# Patient Record
Sex: FEMALE | Race: WHITE | NOT HISPANIC OR LATINO | Employment: OTHER | URBAN - METROPOLITAN AREA
[De-identification: names, ages, dates, MRNs, and addresses within clinical notes are randomized per-mention and may not be internally consistent; named-entity substitution may affect disease eponyms.]

---

## 2017-06-03 ENCOUNTER — HOSPITAL ENCOUNTER (INPATIENT)
Facility: HOSPITAL | Age: 57
LOS: 6 days | Discharge: HOME | DRG: 871 | End: 2017-06-09
Attending: HOSPITALIST | Admitting: HOSPITALIST
Payer: MEDICAID

## 2017-06-03 ENCOUNTER — HOSPITAL ENCOUNTER (EMERGENCY)
Facility: HOSPITAL | Age: 57
Discharge: ANOTHER HOSPITAL | End: 2017-06-03
Attending: SURGERY
Payer: MEDICAID

## 2017-06-03 VITALS
SYSTOLIC BLOOD PRESSURE: 150 MMHG | BODY MASS INDEX: 38.09 KG/M2 | HEART RATE: 96 BPM | HEIGHT: 60 IN | DIASTOLIC BLOOD PRESSURE: 90 MMHG | WEIGHT: 194 LBS | RESPIRATION RATE: 14 BRPM | OXYGEN SATURATION: 98 %

## 2017-06-03 DIAGNOSIS — R41.82 ALTERED MENTAL STATUS, UNSPECIFIED ALTERED MENTAL STATUS TYPE: Primary | ICD-10-CM

## 2017-06-03 DIAGNOSIS — N19 RENAL FAILURE: ICD-10-CM

## 2017-06-03 DIAGNOSIS — E87.5 HYPERKALEMIA: ICD-10-CM

## 2017-06-03 DIAGNOSIS — A41.9 SEPSIS, DUE TO UNSPECIFIED ORGANISM: ICD-10-CM

## 2017-06-03 DIAGNOSIS — A41.9 SEPSIS: ICD-10-CM

## 2017-06-03 DIAGNOSIS — R41.0 CONFUSION: ICD-10-CM

## 2017-06-03 DIAGNOSIS — G93.40 ACUTE ENCEPHALOPATHY: Primary | ICD-10-CM

## 2017-06-03 DIAGNOSIS — G93.40 ENCEPHALOPATHY: ICD-10-CM

## 2017-06-03 PROBLEM — E87.20 METABOLIC ACIDOSIS, NORMAL ANION GAP (NAG): Status: ACTIVE | Noted: 2017-06-03

## 2017-06-03 PROBLEM — N17.9 AKI (ACUTE KIDNEY INJURY): Status: ACTIVE | Noted: 2017-06-03

## 2017-06-03 PROBLEM — M62.82 RHABDOMYOLYSIS: Status: ACTIVE | Noted: 2017-06-03

## 2017-06-03 PROBLEM — I10 HTN (HYPERTENSION): Status: ACTIVE | Noted: 2017-06-03

## 2017-06-03 LAB
ALBUMIN SERPL BCP-MCNC: 3.2 G/DL
ALBUMIN SERPL BCP-MCNC: 3.7 G/DL
ALP SERPL-CCNC: 103 U/L
ALP SERPL-CCNC: 91 U/L
ALT SERPL W/O P-5'-P-CCNC: 41 U/L
ALT SERPL W/O P-5'-P-CCNC: 41 U/L
AMMONIA PLAS-SCNC: 26 UMOL/L
AMPHET+METHAMPHET UR QL: NEGATIVE
ANION GAP SERPL CALC-SCNC: 12 MMOL/L
ANION GAP SERPL CALC-SCNC: 15 MMOL/L
ANION GAP SERPL CALC-SCNC: 8 MMOL/L
APAP SERPL-MCNC: <3 UG/ML
APTT BLDCRRT: 22.1 SEC
AST SERPL-CCNC: 42 U/L
AST SERPL-CCNC: 57 U/L
BACTERIA #/AREA URNS HPF: ABNORMAL /HPF
BARBITURATES UR QL SCN>200 NG/ML: NEGATIVE
BASOPHILS # BLD AUTO: 0.01 K/UL
BASOPHILS # BLD AUTO: 0.01 K/UL
BASOPHILS NFR BLD: 0 %
BASOPHILS NFR BLD: 0 %
BENZODIAZ UR QL SCN>200 NG/ML: NEGATIVE
BILIRUB SERPL-MCNC: 0.5 MG/DL
BILIRUB SERPL-MCNC: 0.6 MG/DL
BILIRUB UR QL STRIP: ABNORMAL
BNP SERPL-MCNC: 126 PG/ML
BUN SERPL-MCNC: 39 MG/DL
BUN SERPL-MCNC: 63 MG/DL
BUN SERPL-MCNC: 72 MG/DL
BZE UR QL SCN: NEGATIVE
CALCIUM SERPL-MCNC: 8.2 MG/DL
CALCIUM SERPL-MCNC: 8.4 MG/DL
CALCIUM SERPL-MCNC: 9.4 MG/DL
CANNABINOIDS UR QL SCN: NEGATIVE
CHLORIDE SERPL-SCNC: 108 MMOL/L
CHLORIDE SERPL-SCNC: 111 MMOL/L
CHLORIDE SERPL-SCNC: 112 MMOL/L
CK MB SERPL-MCNC: 32.5 NG/ML
CK MB SERPL-RTO: 2.6 %
CK SERPL-CCNC: 1246 U/L
CK SERPL-CCNC: 1246 U/L
CK SERPL-CCNC: 1720 U/L
CLARITY UR: CLEAR
CO2 SERPL-SCNC: 18 MMOL/L
CO2 SERPL-SCNC: 18 MMOL/L
CO2 SERPL-SCNC: 20 MMOL/L
COLOR UR: ABNORMAL
CREAT SERPL-MCNC: 1.3 MG/DL
CREAT SERPL-MCNC: 2.5 MG/DL
CREAT SERPL-MCNC: 3.5 MG/DL
CREAT UR-MCNC: 193.8 MG/DL
DIFFERENTIAL METHOD: ABNORMAL
DIFFERENTIAL METHOD: ABNORMAL
EOSINOPHIL # BLD AUTO: 0 K/UL
EOSINOPHIL # BLD AUTO: 0 K/UL
EOSINOPHIL NFR BLD: 0 %
EOSINOPHIL NFR BLD: 0 %
ERYTHROCYTE [DISTWIDTH] IN BLOOD BY AUTOMATED COUNT: 13 %
ERYTHROCYTE [DISTWIDTH] IN BLOOD BY AUTOMATED COUNT: 13.1 %
EST. GFR  (AFRICAN AMERICAN): 16 ML/MIN/1.73 M^2
EST. GFR  (AFRICAN AMERICAN): 24 ML/MIN/1.73 M^2
EST. GFR  (AFRICAN AMERICAN): 53 ML/MIN/1.73 M^2
EST. GFR  (NON AFRICAN AMERICAN): 14 ML/MIN/1.73 M^2
EST. GFR  (NON AFRICAN AMERICAN): 21 ML/MIN/1.73 M^2
EST. GFR  (NON AFRICAN AMERICAN): 46 ML/MIN/1.73 M^2
GLUCOSE SERPL-MCNC: 116 MG/DL
GLUCOSE SERPL-MCNC: 158 MG/DL
GLUCOSE SERPL-MCNC: 167 MG/DL
GLUCOSE UR QL STRIP: NEGATIVE
HCT VFR BLD AUTO: 38.3 %
HCT VFR BLD AUTO: 40.7 %
HGB BLD-MCNC: 12.5 G/DL
HGB BLD-MCNC: 13.1 G/DL
HGB UR QL STRIP: NEGATIVE
INR PPP: 0.9
KETONES UR QL STRIP: ABNORMAL
LACTATE SERPL-SCNC: 1.8 MMOL/L
LACTATE SERPL-SCNC: 2 MMOL/L
LEUKOCYTE ESTERASE UR QL STRIP: ABNORMAL
LYMPHOCYTES # BLD AUTO: 0.7 K/UL
LYMPHOCYTES # BLD AUTO: 1.7 K/UL
LYMPHOCYTES NFR BLD: 3.2 %
LYMPHOCYTES NFR BLD: 7.6 %
MAGNESIUM SERPL-MCNC: 2.1 MG/DL
MAGNESIUM SERPL-MCNC: 3.3 MG/DL
MCH RBC QN AUTO: 31.7 PG
MCH RBC QN AUTO: 32 PG
MCHC RBC AUTO-ENTMCNC: 32.2 %
MCHC RBC AUTO-ENTMCNC: 32.6 %
MCV RBC AUTO: 98 FL
MCV RBC AUTO: 99 FL
METHADONE UR QL SCN>300 NG/ML: NEGATIVE
MICROSCOPIC COMMENT: ABNORMAL
MONOCYTES # BLD AUTO: 1.4 K/UL
MONOCYTES # BLD AUTO: 2.9 K/UL
MONOCYTES NFR BLD: 12.8 %
MONOCYTES NFR BLD: 6.4 %
NEUTROPHILS # BLD AUTO: 18 K/UL
NEUTROPHILS # BLD AUTO: 19.5 K/UL
NEUTROPHILS NFR BLD: 79.6 %
NEUTROPHILS NFR BLD: 90.4 %
NITRITE UR QL STRIP: NEGATIVE
OPIATES UR QL SCN: NORMAL
PCP UR QL SCN>25 NG/ML: NEGATIVE
PH UR STRIP: 5 [PH] (ref 5–8)
PHOSPHATE SERPL-MCNC: 2.5 MG/DL
PHOSPHATE SERPL-MCNC: 7.5 MG/DL
PLATELET # BLD AUTO: 150 K/UL
PLATELET # BLD AUTO: 197 K/UL
PMV BLD AUTO: 9.3 FL
PMV BLD AUTO: 9.5 FL
POTASSIUM SERPL-SCNC: 5.1 MMOL/L
POTASSIUM SERPL-SCNC: 6.1 MMOL/L
POTASSIUM SERPL-SCNC: 7.1 MMOL/L
PROT SERPL-MCNC: 6.6 G/DL
PROT SERPL-MCNC: 7.4 G/DL
PROT UR QL STRIP: ABNORMAL
PROTHROMBIN TIME: 9.3 SEC
RBC # BLD AUTO: 3.91 M/UL
RBC # BLD AUTO: 4.13 M/UL
RBC #/AREA URNS HPF: 5 /HPF (ref 0–4)
SALICYLATES SERPL-MCNC: <5 MG/DL
SODIUM SERPL-SCNC: 139 MMOL/L
SODIUM SERPL-SCNC: 141 MMOL/L
SODIUM SERPL-SCNC: 142 MMOL/L
SP GR UR STRIP: 1.02 (ref 1–1.03)
TOXICOLOGY INFORMATION: NORMAL
TROPONIN I SERPL DL<=0.01 NG/ML-MCNC: <0.006 NG/ML
TSH SERPL DL<=0.005 MIU/L-ACNC: 0.66 UIU/ML
URN SPEC COLLECT METH UR: ABNORMAL
UROBILINOGEN UR STRIP-ACNC: NEGATIVE EU/DL
WBC # BLD AUTO: 21.55 K/UL
WBC # BLD AUTO: 22.76 K/UL
WBC #/AREA URNS HPF: 40 /HPF (ref 0–5)
YEAST URNS QL MICRO: ABNORMAL

## 2017-06-03 PROCEDURE — 93005 ELECTROCARDIOGRAM TRACING: CPT

## 2017-06-03 PROCEDURE — 96365 THER/PROPH/DIAG IV INF INIT: CPT

## 2017-06-03 PROCEDURE — 36415 COLL VENOUS BLD VENIPUNCTURE: CPT

## 2017-06-03 PROCEDURE — 99285 EMERGENCY DEPT VISIT HI MDM: CPT | Mod: 25

## 2017-06-03 PROCEDURE — 93010 ELECTROCARDIOGRAM REPORT: CPT | Mod: 77,,, | Performed by: INTERNAL MEDICINE

## 2017-06-03 PROCEDURE — 84484 ASSAY OF TROPONIN QUANT: CPT

## 2017-06-03 PROCEDURE — 96361 HYDRATE IV INFUSION ADD-ON: CPT

## 2017-06-03 PROCEDURE — 82140 ASSAY OF AMMONIA: CPT

## 2017-06-03 PROCEDURE — 27000221 HC OXYGEN, UP TO 24 HOURS

## 2017-06-03 PROCEDURE — 84443 ASSAY THYROID STIM HORMONE: CPT

## 2017-06-03 PROCEDURE — 94640 AIRWAY INHALATION TREATMENT: CPT

## 2017-06-03 PROCEDURE — 85025 COMPLETE CBC W/AUTO DIFF WBC: CPT | Mod: 91

## 2017-06-03 PROCEDURE — 84100 ASSAY OF PHOSPHORUS: CPT | Mod: 91

## 2017-06-03 PROCEDURE — 82553 CREATINE MB FRACTION: CPT

## 2017-06-03 PROCEDURE — 85730 THROMBOPLASTIN TIME PARTIAL: CPT

## 2017-06-03 PROCEDURE — 96375 TX/PRO/DX INJ NEW DRUG ADDON: CPT

## 2017-06-03 PROCEDURE — 81000 URINALYSIS NONAUTO W/SCOPE: CPT

## 2017-06-03 PROCEDURE — 63600175 PHARM REV CODE 636 W HCPCS: Performed by: SURGERY

## 2017-06-03 PROCEDURE — 63600175 PHARM REV CODE 636 W HCPCS: Performed by: HOSPITALIST

## 2017-06-03 PROCEDURE — 27000494 *HC OXYGEN SET UP (STAH ONLY)

## 2017-06-03 PROCEDURE — 83605 ASSAY OF LACTIC ACID: CPT

## 2017-06-03 PROCEDURE — 80053 COMPREHEN METABOLIC PANEL: CPT

## 2017-06-03 PROCEDURE — 11000001 HC ACUTE MED/SURG PRIVATE ROOM

## 2017-06-03 PROCEDURE — 25000242 PHARM REV CODE 250 ALT 637 W/ HCPCS: Performed by: SURGERY

## 2017-06-03 PROCEDURE — 87076 CULTURE ANAEROBE IDENT EACH: CPT

## 2017-06-03 PROCEDURE — 80048 BASIC METABOLIC PNL TOTAL CA: CPT

## 2017-06-03 PROCEDURE — 85025 COMPLETE CBC W/AUTO DIFF WBC: CPT

## 2017-06-03 PROCEDURE — 80053 COMPREHEN METABOLIC PANEL: CPT | Mod: 91

## 2017-06-03 PROCEDURE — 93010 ELECTROCARDIOGRAM REPORT: CPT | Mod: ,,, | Performed by: INTERNAL MEDICINE

## 2017-06-03 PROCEDURE — 87040 BLOOD CULTURE FOR BACTERIA: CPT | Mod: 59

## 2017-06-03 PROCEDURE — 63600175 PHARM REV CODE 636 W HCPCS

## 2017-06-03 PROCEDURE — 80329 ANALGESICS NON-OPIOID 1 OR 2: CPT

## 2017-06-03 PROCEDURE — 84100 ASSAY OF PHOSPHORUS: CPT

## 2017-06-03 PROCEDURE — 25000003 PHARM REV CODE 250: Performed by: SURGERY

## 2017-06-03 PROCEDURE — 85610 PROTHROMBIN TIME: CPT

## 2017-06-03 PROCEDURE — 80307 DRUG TEST PRSMV CHEM ANLYZR: CPT | Mod: 59

## 2017-06-03 PROCEDURE — 83880 ASSAY OF NATRIURETIC PEPTIDE: CPT

## 2017-06-03 PROCEDURE — 83735 ASSAY OF MAGNESIUM: CPT | Mod: 91

## 2017-06-03 PROCEDURE — 25000003 PHARM REV CODE 250: Performed by: HOSPITALIST

## 2017-06-03 PROCEDURE — 83605 ASSAY OF LACTIC ACID: CPT | Mod: 91

## 2017-06-03 PROCEDURE — 82550 ASSAY OF CK (CPK): CPT | Mod: 91

## 2017-06-03 PROCEDURE — 83735 ASSAY OF MAGNESIUM: CPT

## 2017-06-03 PROCEDURE — 80307 DRUG TEST PRSMV CHEM ANLYZR: CPT

## 2017-06-03 RX ORDER — HYDROCODONE BITARTRATE AND ACETAMINOPHEN 10; 325 MG/1; MG/1
1 TABLET ORAL EVERY 8 HOURS PRN
COMMUNITY

## 2017-06-03 RX ORDER — SODIUM CHLORIDE 9 MG/ML
INJECTION, SOLUTION INTRAVENOUS CONTINUOUS
Status: DISCONTINUED | OUTPATIENT
Start: 2017-06-03 | End: 2017-06-04

## 2017-06-03 RX ORDER — ONDANSETRON 4 MG/1
8 TABLET, ORALLY DISINTEGRATING ORAL EVERY 8 HOURS PRN
Status: DISCONTINUED | OUTPATIENT
Start: 2017-06-03 | End: 2017-06-09 | Stop reason: HOSPADM

## 2017-06-03 RX ORDER — ALBUTEROL SULFATE 2.5 MG/.5ML
10 SOLUTION RESPIRATORY (INHALATION)
Status: COMPLETED | OUTPATIENT
Start: 2017-06-03 | End: 2017-06-03

## 2017-06-03 RX ORDER — NALOXONE HCL 0.4 MG/ML
VIAL (ML) INJECTION
Status: COMPLETED
Start: 2017-06-03 | End: 2017-06-03

## 2017-06-03 RX ORDER — HYDRALAZINE HYDROCHLORIDE 25 MG/1
25 TABLET, FILM COATED ORAL EVERY 8 HOURS PRN
Status: DISCONTINUED | OUTPATIENT
Start: 2017-06-04 | End: 2017-06-09 | Stop reason: HOSPADM

## 2017-06-03 RX ORDER — ACETAMINOPHEN 325 MG/1
650 TABLET ORAL EVERY 4 HOURS PRN
Status: DISCONTINUED | OUTPATIENT
Start: 2017-06-03 | End: 2017-06-09 | Stop reason: HOSPADM

## 2017-06-03 RX ORDER — AMOXICILLIN 250 MG
1 CAPSULE ORAL DAILY PRN
Status: DISCONTINUED | OUTPATIENT
Start: 2017-06-03 | End: 2017-06-09 | Stop reason: HOSPADM

## 2017-06-03 RX ORDER — IBUPROFEN 200 MG
16 TABLET ORAL
Status: DISCONTINUED | OUTPATIENT
Start: 2017-06-03 | End: 2017-06-09 | Stop reason: HOSPADM

## 2017-06-03 RX ORDER — SODIUM CHLORIDE 9 MG/ML
1000 INJECTION, SOLUTION INTRAVENOUS
Status: COMPLETED | OUTPATIENT
Start: 2017-06-03 | End: 2017-06-03

## 2017-06-03 RX ORDER — IBUPROFEN 200 MG
24 TABLET ORAL
Status: DISCONTINUED | OUTPATIENT
Start: 2017-06-03 | End: 2017-06-09 | Stop reason: HOSPADM

## 2017-06-03 RX ORDER — NALOXONE HCL 0.4 MG/ML
0.4 VIAL (ML) INJECTION
Status: COMPLETED | OUTPATIENT
Start: 2017-06-03 | End: 2017-06-03

## 2017-06-03 RX ORDER — ALBUTEROL SULFATE 2.5 MG/.5ML
SOLUTION RESPIRATORY (INHALATION)
Status: DISCONTINUED
Start: 2017-06-03 | End: 2017-06-03 | Stop reason: HOSPADM

## 2017-06-03 RX ORDER — ATENOLOL 25 MG/1
25 TABLET ORAL DAILY
COMMUNITY

## 2017-06-03 RX ORDER — LISINOPRIL 10 MG/1
10 TABLET ORAL DAILY
COMMUNITY
End: 2017-06-28

## 2017-06-03 RX ORDER — METRONIDAZOLE 500 MG/100ML
500 INJECTION, SOLUTION INTRAVENOUS
Status: DISCONTINUED | OUTPATIENT
Start: 2017-06-03 | End: 2017-06-05

## 2017-06-03 RX ADMIN — CEFTRIAXONE 1 G: 1 INJECTION, SOLUTION INTRAVENOUS at 05:06

## 2017-06-03 RX ADMIN — SODIUM CHLORIDE 1000 ML: 0.9 INJECTION, SOLUTION INTRAVENOUS at 01:06

## 2017-06-03 RX ADMIN — CEFTRIAXONE 2 G: 2 INJECTION, SOLUTION INTRAVENOUS at 11:06

## 2017-06-03 RX ADMIN — DEXTROSE MONOHYDRATE 25 G: 25 INJECTION, SOLUTION INTRAVENOUS at 12:06

## 2017-06-03 RX ADMIN — INSULIN HUMAN 5 UNITS: 100 INJECTION, SOLUTION PARENTERAL at 12:06

## 2017-06-03 RX ADMIN — SODIUM POLYSTYRENE SULFONATE 15 G: 15 SUSPENSION ORAL; RECTAL at 12:06

## 2017-06-03 RX ADMIN — Medication 0.4 MG: at 11:06

## 2017-06-03 RX ADMIN — SODIUM CHLORIDE 1000 ML: 0.9 INJECTION, SOLUTION INTRAVENOUS at 12:06

## 2017-06-03 RX ADMIN — SODIUM CHLORIDE 1000 ML: 0.9 INJECTION, SOLUTION INTRAVENOUS at 03:06

## 2017-06-03 RX ADMIN — METRONIDAZOLE 500 MG: 500 INJECTION, SOLUTION INTRAVENOUS at 11:06

## 2017-06-03 RX ADMIN — HYDRALAZINE HYDROCHLORIDE 25 MG: 25 TABLET, FILM COATED ORAL at 11:06

## 2017-06-03 RX ADMIN — ALBUTEROL SULFATE 10 MG: 2.5 SOLUTION RESPIRATORY (INHALATION) at 12:06

## 2017-06-03 RX ADMIN — NALOXONE HYDROCHLORIDE 0.4 MG: 0.4 INJECTION, SOLUTION INTRAMUSCULAR; INTRAVENOUS; SUBCUTANEOUS at 11:06

## 2017-06-03 NOTE — ED NOTES
Urine collected from hat placed in bedside commode.  Patient had moderate amount of diarrhea in commode.  Assisted patient to clean herself and then back to bed.

## 2017-06-03 NOTE — ED PROVIDER NOTES
[facility]                                        Alix 3, 2017                   Chief Complaint  56 y.o. female with Altered Mental Status     History of Present Illness  Lien Sotelo presents to the emergency room with altered mental status today  Daughter noticed the patient was normal before she went to bed last night per history  Went to see the patient this morning, found her obtunded on the couch with emesis  Patient was hard to arouse and the daughter called 911, patient confused and agitated  Patient has no history of seizures, has bite marks on the tongue on ER evaluation now  Patient states that she does not remember any of the events preceding the ER arrival  Patient is on pain medication on a daily basis, several more missing per daughter here  Patient cannot get the year right, does not remember the president, appears agitated  Patient has no gross motor deficits or facial droop, hypotensive on arrival as morning    The history is provided by the patient  No past medical history on file.  No past surgical history on file.   No Known Allergies   History reviewed. No pertinent family history.    Review of Systems and Physical Exam     Review of Systems  -- Constitution - no fever, denies fatigue, no weakness, no chills  -- Eyes - no tearing or redness, no visual disturbance  -- Ear, Nose - no tinnitus or earache, no nasal congestion or discharge  -- Mouth,Throat - no sore throat, no toothache, normal voice, normal swallowing  -- Respiratory - denies cough and congestion, no shortness of breath, no BORJA  -- Cardiovascular - denies chest pain, no palpitations, denies claudication  -- Gastrointestinal - denies abdominal pain, nausea, vomiting, or diarrhea  -- Genitourinary - no dysuria, no denies flank pain, no hematuria or frequency   -- Musculoskeletal - denies back pain, negative for myalgias and arthralgias   -- Neurological - found obtunded, confusion, agitation, no gross motor  deficits  -- Skin - denies pallor, rash, or changes in skin. no hives or welts noted    Vital Signs  -- Her blood pressure is 97/81 and her pulse is 95.   -- Her respiration is 14 and oxygen saturation is 92% (abnormal).      Physical Exam  -- Nursing note and vitals reviewed  -- Constitutional: Appears well-developed and well-nourished  -- Head: Atraumatic. Normocephalic. No obvious abnormality  -- Eyes: Pupils are equal and reactive to light. Normal conjunctiva and lids  -- Cardiac: Normal rate, regular rhythm and normal heart sounds  -- Pulmonary: Normal respiratory effort, breath sounds clear to auscultation  -- Abdominal: Soft, no tenderness. Normal bowel sounds. Normal liver edge  -- Genitourinary: no flank pain on exam, no suprapubic pain by palpation   -- Musculoskeletal: Normal range of motion, no effusions. Joints stable   -- Neurological: No focal deficits. Showed good interaction with staff  -- Vascular: Posterior tibial, dorsalis pedis and radial pulses 2+ bilaterally      Emergency Room Course     Abnormal lab values  -- Color, UA Orange (*)   -- Protein, UA Trace (*)   -- Ketones, UA Trace (*)   -- Bilirubin (UA) 1+ (*)   -- Leukocytes, UA Trace (*)   -- Magnesium 3.3 (*)   -- Phosphorus 7.5 (*)   -- Potassium 7.1 (*)   -- CO2 18 (*)   -- Glucose 167 (*)   -- BUN, Bld 72 (*)   -- Creatinine 3.5 (*)   -- AST 42 (*)   -- eGFR  14 (*)   -- WBC 21.55 (*)   -- CPK 1246 (*)   --  (*)   -- RBC, UA 5 (*)   -- WBC, UA 40 (*)   -- Bacteria, UA Moderate (*)   -- Yeast, UA Occasional (*)   -- Potassium 6.1 (*)   -- Chloride 112 (*)   -- CO2 18 (*)   -- Glucose 116 (*)   -- BUN, Bld 63 (*)   -- Creatinine 2.5 (*)   -- Calcium 8.4 (*)     EKG  -- Normal sinus rhythm  -- Nonspecific ST abnormality  -- Abnormal ECG  -- No previous ECGs available    Radiology  -- The CT of the head performed in the ER today was negative for acute pathology  -- Chest x-ray showed no infiltrate and showed no acute pathology      Medications Given  -- dextrose 50% injection 25 g (25 g Intravenous Given 6/3/17 1230)   -- albuterol sulfate 2.5 mg/0.5 mL nebulizer solution (not administered)   -- naloxone 0.4 mg/mL injection 0.4 mg (0.4 mg Intravenous Given 6/3/17 1121)   -- 0.9%  NaCl infusion (0 mLs Intravenous Stopped 6/3/17 1330)   -- sodium polystyrene 15 gram/60 mL suspension 15 g (15 g Oral Given 6/3/17 1233)   -- insulin regular injection 5 Units (5 Units Intravenous Given 6/3/17 1228)   -- albuterol sulfate nebulizer solution 10 mg (10 mg Nebulization Given 6/3/17 1255)   -- 0.9%  NaCl infusion (0 mLs Intravenous Stopped 6/3/17 1533)   -- 0.9%  NaCl infusion (1,000 mLs Intravenous New Bag 6/3/17 1530)   -- 0.9%  NaCl infusion (0 mLs Intravenous Stopped 6/3/17 1506)     Diagnosis  -- Altered mental status  -- Confusion  -- Hyperkalemia  -- Renal failure     Disposition and Plan  -- Disposition: transfer  -- Condition: stable  -- The patient needs a higher level of care  -- The patient is appropriate for transfer  -- The patient was accepted for transfer at [facility]    This note is dictated on Dragon Natural Speaking word recognition program.  There are word recognition mistakes that are occasionally missed on review.           Alan Beckford MD  06/03/17 0384

## 2017-06-03 NOTE — PLAN OF CARE
Outside Transfer Acceptance Note    Transferring Physician or Mid Level Provider/Speciality: Dr Alan Beckford    Accepting Physician: Eugenia Temple     Date of Acceptance: 06/03/2017     Code Status: Full    Transferring Facility/Hospital: [facility]    [place] [facility]    Report from Transferring Physician or Mid-Level provider/Hospital course: 56 F with a PMH of chronic back pain on opioids.  Pt was seen normal last night before bed, but when daughter came over this morning, she found her on the cough with evidence of vomit.  Suspected that she may have taken opioids, but unsure of amount. Pt was unresponsive at that time. EMS brought her to [facility] where she had a normal head CT scan.  Workup revealed hypovolemia, acute renal failure, hyperkalemia, leukocytosis, and UTI. Hyperkalemia and JACEY improving with IV fluids.  Pt now much more responsive, but not appropriate. Concern for encephalopathy due to medication / infection / renal failure.     To do list upon patient arrival: continue IV fluids.  Trend potassium / renal function. Treat UTI. May need neuro consult for help with encephalopathy workup if does not improve with conservative treatment.     Please call extension [phone] upon patient arrival to floor for Hospital Medicine admit team assignment and for additional admit orders. If patient is coming from another [facility] facility please also call [phone] to inform the admit team/office that patient has arrived from the [facility] facility to the floor so patient can be evaluated.

## 2017-06-03 NOTE — ED TRIAGE NOTES
Patient is brought in today with altered mental status.  Daughter found her unresponsive on the cough and called dalila..  There was a bottle of norco that was 2 days old but had almost 40 pill missing.  Patient admits she sold some and took some.  Admits she is addicted to them.

## 2017-06-03 NOTE — ED NOTES
Patient to the bathroom via wheelchair.  Diarrhea noted to toilet.  Patient able to transfer self from wheelchair to bed much better that previously.  Seems less confused but still has trouble finding words.

## 2017-06-04 PROBLEM — R19.7 WATERY DIARRHEA: Status: ACTIVE | Noted: 2017-06-04

## 2017-06-04 LAB
ALBUMIN SERPL BCP-MCNC: 3 G/DL
ALP SERPL-CCNC: 98 U/L
ALT SERPL W/O P-5'-P-CCNC: 40 U/L
ANION GAP SERPL CALC-SCNC: 8 MMOL/L
AST SERPL-CCNC: 51 U/L
BASOPHILS # BLD AUTO: 0.01 K/UL
BASOPHILS # BLD AUTO: 0.02 K/UL
BASOPHILS NFR BLD: 0 %
BASOPHILS NFR BLD: 0.1 %
BILIRUB SERPL-MCNC: 0.5 MG/DL
BUN SERPL-MCNC: 28 MG/DL
CALCIUM SERPL-MCNC: 8.1 MG/DL
CHLORIDE SERPL-SCNC: 111 MMOL/L
CK SERPL-CCNC: 1406 U/L
CO2 SERPL-SCNC: 19 MMOL/L
CREAT SERPL-MCNC: 0.8 MG/DL
DIFFERENTIAL METHOD: ABNORMAL
DIFFERENTIAL METHOD: ABNORMAL
EOSINOPHIL # BLD AUTO: 0 K/UL
EOSINOPHIL # BLD AUTO: 0 K/UL
EOSINOPHIL NFR BLD: 0 %
EOSINOPHIL NFR BLD: 0 %
ERYTHROCYTE [DISTWIDTH] IN BLOOD BY AUTOMATED COUNT: 12.7 %
ERYTHROCYTE [DISTWIDTH] IN BLOOD BY AUTOMATED COUNT: 12.8 %
EST. GFR  (AFRICAN AMERICAN): >60 ML/MIN/1.73 M^2
EST. GFR  (NON AFRICAN AMERICAN): >60 ML/MIN/1.73 M^2
GLUCOSE SERPL-MCNC: 165 MG/DL
HCT VFR BLD AUTO: 38.7 %
HCT VFR BLD AUTO: 39.1 %
HGB BLD-MCNC: 12.8 G/DL
HGB BLD-MCNC: 13.1 G/DL
LYMPHOCYTES # BLD AUTO: 1.4 K/UL
LYMPHOCYTES # BLD AUTO: 1.8 K/UL
LYMPHOCYTES NFR BLD: 6.3 %
LYMPHOCYTES NFR BLD: 8.5 %
MCH RBC QN AUTO: 31.6 PG
MCH RBC QN AUTO: 31.7 PG
MCHC RBC AUTO-ENTMCNC: 33.1 %
MCHC RBC AUTO-ENTMCNC: 33.5 %
MCV RBC AUTO: 94 FL
MCV RBC AUTO: 96 FL
MONOCYTES # BLD AUTO: 1 K/UL
MONOCYTES # BLD AUTO: 1.3 K/UL
MONOCYTES NFR BLD: 4.6 %
MONOCYTES NFR BLD: 5.7 %
NEUTROPHILS # BLD AUTO: 18.2 K/UL
NEUTROPHILS # BLD AUTO: 19.4 K/UL
NEUTROPHILS NFR BLD: 86.4 %
NEUTROPHILS NFR BLD: 87.5 %
PLATELET # BLD AUTO: 137 K/UL
PLATELET # BLD AUTO: 138 K/UL
PMV BLD AUTO: 9.5 FL
PMV BLD AUTO: 9.8 FL
POTASSIUM SERPL-SCNC: 4.1 MMOL/L
PROCALCITONIN SERPL IA-MCNC: 0.12 NG/ML
PROT SERPL-MCNC: 6.3 G/DL
RBC # BLD AUTO: 4.04 M/UL
RBC # BLD AUTO: 4.14 M/UL
SODIUM SERPL-SCNC: 138 MMOL/L
WBC # BLD AUTO: 21.05 K/UL
WBC # BLD AUTO: 22.18 K/UL

## 2017-06-04 PROCEDURE — 85025 COMPLETE CBC W/AUTO DIFF WBC: CPT

## 2017-06-04 PROCEDURE — 36600 WITHDRAWAL OF ARTERIAL BLOOD: CPT

## 2017-06-04 PROCEDURE — 25000003 PHARM REV CODE 250: Performed by: INTERNAL MEDICINE

## 2017-06-04 PROCEDURE — 63600175 PHARM REV CODE 636 W HCPCS: Performed by: HOSPITALIST

## 2017-06-04 PROCEDURE — 99223 1ST HOSP IP/OBS HIGH 75: CPT | Mod: AI,,, | Performed by: INTERNAL MEDICINE

## 2017-06-04 PROCEDURE — 80053 COMPREHEN METABOLIC PANEL: CPT

## 2017-06-04 PROCEDURE — 82803 BLOOD GASES ANY COMBINATION: CPT

## 2017-06-04 PROCEDURE — 36415 COLL VENOUS BLD VENIPUNCTURE: CPT

## 2017-06-04 PROCEDURE — 84145 PROCALCITONIN (PCT): CPT

## 2017-06-04 PROCEDURE — 87449 NOS EACH ORGANISM AG IA: CPT

## 2017-06-04 PROCEDURE — 25000003 PHARM REV CODE 250: Performed by: HOSPITALIST

## 2017-06-04 PROCEDURE — 11000001 HC ACUTE MED/SURG PRIVATE ROOM

## 2017-06-04 PROCEDURE — 99900035 HC TECH TIME PER 15 MIN (STAT)

## 2017-06-04 PROCEDURE — 25000003 PHARM REV CODE 250: Performed by: STUDENT IN AN ORGANIZED HEALTH CARE EDUCATION/TRAINING PROGRAM

## 2017-06-04 PROCEDURE — 82550 ASSAY OF CK (CPK): CPT

## 2017-06-04 RX ORDER — ENOXAPARIN SODIUM 100 MG/ML
40 INJECTION SUBCUTANEOUS EVERY 24 HOURS
Status: DISCONTINUED | OUTPATIENT
Start: 2017-06-04 | End: 2017-06-04

## 2017-06-04 RX ORDER — HEPARIN SODIUM 5000 [USP'U]/ML
5000 INJECTION, SOLUTION INTRAVENOUS; SUBCUTANEOUS EVERY 8 HOURS
Status: DISCONTINUED | OUTPATIENT
Start: 2017-06-04 | End: 2017-06-06

## 2017-06-04 RX ORDER — CARVEDILOL 6.25 MG/1
6.25 TABLET ORAL 2 TIMES DAILY
Status: DISCONTINUED | OUTPATIENT
Start: 2017-06-04 | End: 2017-06-07

## 2017-06-04 RX ORDER — TRAMADOL HYDROCHLORIDE 50 MG/1
50 TABLET ORAL EVERY 6 HOURS PRN
Status: DISCONTINUED | OUTPATIENT
Start: 2017-06-04 | End: 2017-06-09 | Stop reason: HOSPADM

## 2017-06-04 RX ORDER — ATENOLOL 25 MG/1
25 TABLET ORAL DAILY
Status: DISCONTINUED | OUTPATIENT
Start: 2017-06-04 | End: 2017-06-04

## 2017-06-04 RX ORDER — HYDRALAZINE HYDROCHLORIDE 20 MG/ML
10 INJECTION INTRAMUSCULAR; INTRAVENOUS ONCE
Status: DISCONTINUED | OUTPATIENT
Start: 2017-06-04 | End: 2017-06-04

## 2017-06-04 RX ORDER — LABETALOL HYDROCHLORIDE 5 MG/ML
10 INJECTION, SOLUTION INTRAVENOUS ONCE
Status: COMPLETED | OUTPATIENT
Start: 2017-06-04 | End: 2017-06-04

## 2017-06-04 RX ADMIN — METRONIDAZOLE 500 MG: 500 INJECTION, SOLUTION INTRAVENOUS at 03:06

## 2017-06-04 RX ADMIN — TRAMADOL HYDROCHLORIDE 50 MG: 50 TABLET, COATED ORAL at 09:06

## 2017-06-04 RX ADMIN — LABETALOL HYDROCHLORIDE 10 MG: 5 INJECTION, SOLUTION INTRAVENOUS at 11:06

## 2017-06-04 RX ADMIN — METRONIDAZOLE 500 MG: 500 INJECTION, SOLUTION INTRAVENOUS at 08:06

## 2017-06-04 RX ADMIN — CARVEDILOL 6.25 MG: 6.25 TABLET, FILM COATED ORAL at 10:06

## 2017-06-04 RX ADMIN — Medication 500 MG: at 02:06

## 2017-06-04 RX ADMIN — CARVEDILOL 6.25 MG: 6.25 TABLET, FILM COATED ORAL at 09:06

## 2017-06-04 RX ADMIN — TRAMADOL HYDROCHLORIDE 50 MG: 50 TABLET, COATED ORAL at 10:06

## 2017-06-04 RX ADMIN — HEPARIN SODIUM 5000 UNITS: 5000 INJECTION, SOLUTION INTRAVENOUS; SUBCUTANEOUS at 06:06

## 2017-06-04 RX ADMIN — SODIUM CHLORIDE: 0.9 INJECTION, SOLUTION INTRAVENOUS at 12:06

## 2017-06-04 NOTE — ASSESSMENT & PLAN NOTE
- CPK of 1240 --> 1740. Likely 2/2 immobility  - IVF gtt at above  - check CPK in the AM to ensure downtrend

## 2017-06-04 NOTE — ASSESSMENT & PLAN NOTE
- possible sources being UTI vs PNA/ CAP/ aspiration PNA vs Cholecystitis vs CNS infection  - treat CAP with ceftriaxone and azithromycin  - treat UTI and CXT  - metronidazole added for possible aspiration PNA  - metronidazole and ceftriaxone should cover for possible cholecystitis  - 2 g ceftriaxone given for possible meningitis  - RUQ U/S to rule to cholecystitis, although less likely given normal LFTs  - will consider addition of vancomycin/ ampicillin and LP after re-evaluation  - f/u Ucx and blood cx  - check resp cx  - check urine legionella antigen

## 2017-06-04 NOTE — PROGRESS NOTES
Re-evaluated pt at bedside with Dr Gautam. She is feeling well with significant improvement in her encephalopathy. She does cont to have nausea. Denies photophobia at this time. no back or neck pain at this time. Cont to have mild occipital HA where she may have hit her head. She continues to note that she bit her tongue and chipped her teeth while she was obtunded. Admits to taking norco 10/325 TID scheduled. Was in usual state of health before her AMS episode.     Spiked a temp of 100F. Orients X3, converses appropriately, no neuro deficits on exam. Otherwise, stable. WBC Of 22k. Otherwise, JACEY and electrolyte derangements normalized. ABG was unremarkable.     Discussed the case with Dr Gautam. There is high concern for possible opioid OD leading to AMS. JACEY may have occurred first, which subsequently may have escalated the effect of opioids. Although, pt does not endorse decreased PO intake or feeling dehydrated in the past several days.     Given above, will hold off on LP and further work up for meningitis.     Yani Cardenas MD  Internal Medicine PGY-2  Pager [phone]

## 2017-06-04 NOTE — PROGRESS NOTES
Pt left the floor for X ray , on stretcher with [facility] . Pt is awake ,alert and oriented x 4 . Stable V/S .

## 2017-06-04 NOTE — PLAN OF CARE
Problem: Fall Risk (Adult)  Goal: Absence of Falls  Patient will demonstrate the desired outcomes by discharge/transition of care.   Outcome: Ongoing (interventions implemented as appropriate)  Pt remained free of falls and injuries per shift , fall precautions maintained and family at bedside .

## 2017-06-04 NOTE — H&P
"[facility] Hospital Medicine  History & Physical    Patient Name: Lien Sotelo  MRN: [id number]  Admission Date: 6/3/2017  Attending Physician: Gabby Bassett MD   Primary Care Provider: Primary Doctor [facility] Medicine Team: [facility] HOSP MED 3 Yani Cardenas MD     Patient information was obtained from patient, relative(s), past medical records and ER records.     Subjective:     Principal Problem: AMS    Chief Complaint: AMS     HPI: 57 y/o F with hx of HTN (on lisinopril and atenolol ) , smoker, and with chronic back pain (s/p 3 surgeries, on opioids for pain) presents to  [facility] as a transfer from [facility]. Patient was brought to [facility] ED after she was found confused and obtunded on her sofa at home. Daughter states that patient may have vomited. She also notes jerking motions that have been concerning her for seizures. There is concern that pt may have taken opioids, although pt denies. Patient states that she likely hit the back of her head as she is having occipital HA and a "bump". + blurred vision. She also notes dry/ cracked lips (possibily of biting her tongue). Pt also states she has been having a cough for the past few days. Last time pt was seen within her normal states of health was on 6/2 around 5 pm, on 6/3 around 10AM she was found on the sofa and altered. The day prior to that pt was riding her bicycle (less than 1 mi distance). Pt denies any hx of seizures, recent f/v, abd pain, dysuria, paresthesias, neuro deficits, CP or SOB    At [facility] ED, patient was found to be hypotensive/ hypovolemic, in JACEY with Cr of 3.5, K+ of 7.1, bicarb of 18, CPK of 1240. WBC Of 21K. CT head was unremarkable. UA with evidence of UTI. She received IV rocephin and 4L NS with improvement in her encephalopathy. Her K improved from 7.1 --> 6.1 after shifting with insulin and albuterol. And her Cr improved from 3.5 --> 2.5 with IVF. On admission at [facility], pt was communicating " appropriately, with no neuro deficits.     Past Medical History:   Diagnosis Date    Chronic back pain     HTN (hypertension)        Past Surgical History:   Procedure Laterality Date    BACK SURGERY      X 3       Review of patient's allergies indicates:  No Known Allergies    Current Facility-Administered Medications on File Prior to Encounter   Medication    [COMPLETED] 0.9%  NaCl infusion    [COMPLETED] 0.9%  NaCl infusion    [COMPLETED] 0.9%  NaCl infusion    [COMPLETED] 0.9%  NaCl infusion    [COMPLETED] albuterol sulfate nebulizer solution 10 mg    [COMPLETED] cefTRIAXone (ROCEPHIN) 1 g in dextrose 5 % 50 mL IVPB    [COMPLETED] insulin regular injection 5 Units    [COMPLETED] naloxone 0.4 mg/mL injection 0.4 mg    [COMPLETED] sodium polystyrene 15 gram/60 mL suspension 15 g    [DISCONTINUED] albuterol sulfate 2.5 mg/0.5 mL nebulizer solution    [DISCONTINUED] dextrose 50% injection 25 g     Current Outpatient Prescriptions on File Prior to Encounter   Medication Sig    hydrocodone-acetaminophen 10-325mg (NORCO)  mg Tab Take by mouth.     Family History     Problem Relation (Age of Onset)    Heart disease Mother, Father        Social History Main Topics    Smoking status: Current Every Day Smoker     Types: Cigarettes    Smokeless tobacco: Not on file    Alcohol use No    Drug use:      Types: Hydrocodone    Sexual activity: Not on file     Review of Systems   Constitutional: Negative for chills, diaphoresis, fatigue, fever and unexpected weight change.   HENT: Negative for congestion, rhinorrhea, sinus pressure and tinnitus.    Eyes: Positive for visual disturbance.   Respiratory: Positive for cough. Negative for chest tightness, shortness of breath and wheezing.    Cardiovascular: Negative for chest pain, palpitations and leg swelling.   Gastrointestinal: Positive for nausea and vomiting. Negative for abdominal distention, abdominal pain and diarrhea.   Genitourinary: Negative for  decreased urine volume, difficulty urinating, dysuria and hematuria.   Musculoskeletal: Positive for back pain and neck pain.        Both are chronic in nature   Skin: Negative for rash.   Neurological: Positive for weakness and headaches.   Psychiatric/Behavioral: Positive for confusion.     Objective:     Vital Signs (Most Recent):  Temp: 99.5 °F (37.5 °C) (06/03/17 2159)  Pulse: 96 (06/03/17 2159)  Resp: 15 (06/03/17 2159)  BP: (!) 187/102 (06/03/17 2159)  SpO2: 96 % (06/03/17 2159) Vital Signs (24h Range):  Temp:  [99.5 °F (37.5 °C)] 99.5 °F (37.5 °C)  Pulse:  [89-98] 96  Resp:  [10-27] 15  SpO2:  [91 %-100 %] 96 %  BP: ()/() 187/102        There is no height or weight on file to calculate BMI.    Physical Exam   Constitutional: She is oriented to person, place, and time. She appears well-developed and well-nourished.   Mildly distressed with closed eyes   HENT:   Head: Normocephalic and atraumatic.   Eyes: EOM are normal. Pupils are equal, round, and reactive to light. No scleral icterus.   Neck: Normal range of motion. Neck supple.   Cardiovascular: Normal rate, regular rhythm and normal heart sounds.  Exam reveals no friction rub.    No murmur heard.  Pulmonary/Chest: Effort normal. No respiratory distress. She has no wheezes. She has no rales.   Diffusely decreased breath sounds   Abdominal: Soft. Bowel sounds are normal. There is tenderness. There is guarding.   RUQ tenderness with + mac's   Musculoskeletal: Normal range of motion. She exhibits no edema or tenderness.   Lymphadenopathy:     She has no cervical adenopathy.   Neurological: She is alert and oriented to person, place, and time. No cranial nerve deficit.   Tenderness of posterior neck and lower back. Difficult to appreciate kurnig and brudzinski given chronic neck and back pain that is usually elicited with these maneuvers    Skin: Skin is warm and dry. No rash noted.        Significant Labs:   ABGs: No results for input(s): PH,  PCO2, HCO3, POCSATURATED, BE in the last 48 hours.  Blood Culture: No results for input(s): LABBLOO in the last 48 hours.  CBC:   Recent Labs  Lab 06/03/17  1127 06/03/17  2208   WBC 21.55* 22.76*   HGB 13.1 12.5   HCT 40.7 38.3    150     CMP:   Recent Labs  Lab 06/03/17  1126 06/03/17  1348 06/03/17  2208    142 139   K 7.1* 6.1* 5.1    112* 111*   CO2 18* 18* 20*   * 116* 158*   BUN 72* 63* 39*   CREATININE 3.5* 2.5* 1.3   CALCIUM 9.4 8.4* 8.2*   PROT 7.4  --  6.6   ALBUMIN 3.7  --  3.2*   BILITOT 0.5  --  0.6   ALKPHOS 91  --  103   AST 42*  --  57*   ALT 41  --  41   ANIONGAP 15 12 8   EGFRNONAA 14* 21* 46.0*     Cardiac Markers:   Recent Labs  Lab 06/03/17  1127   *     Lactic Acid:   Recent Labs  Lab 06/03/17  1219 06/03/17  2208   LACTATE 2.0 1.8     Lipase: No results for input(s): LIPASE in the last 48 hours.  Respiratory Culture: No results for input(s): GSRESP, RESPIRATORYC in the last 48 hours.  Troponin:   Recent Labs  Lab 06/03/17  1126   TROPONINI <0.006     TSH:   Recent Labs  Lab 06/03/17  1126   TSH 0.660      Ref. Range 6/3/2017 11:26 6/3/2017 22:08   CPK Latest Ref Range: 20 - 180 U/L 1246 (H) 1720 (H)     Urine Culture: No results for input(s): LABURIN in the last 48 hours.  Urine Studies:   Recent Labs  Lab 06/03/17  1144   COLORU Cuervo*   APPEARANCEUA Clear   PHUR 5.0   SPECGRAV 1.025   PROTEINUA Trace*   GLUCUA Negative   KETONESU Trace*   BILIRUBINUA 1+*   OCCULTUA Negative   NITRITE Negative   UROBILINOGEN Negative   LEUKOCYTESUR Trace*   RBCUA 5*   WBCUA 40*   BACTERIA Moderate*       Significant Imaging:  CT head   No acute cardiopulmonary abnormalities appreciated radiographically.    Assessment/Plan:     Sepsis    - possible sources being UTI (given UA ) vs PNA/ CAP/ aspiration PNA (given possible opioid OD and vomitus found in an obtunded pt) vs Cholecystitis (given PE findings but LFTs are ok, so less likely) vs CNS infection  - treat CAP with  ceftriaxone and azithromycin  - treat UTI and CXT  - metronidazole added for possible aspiration PNA  - metronidazole and ceftriaxone should cover for possible cholecystitis  - 2 g ceftriaxone given for possible meningitis  - RUQ U/S to rule to cholecystitis, although less likely given normal LFTs  - will consider addition of vancomycin/ ampicillin and LP after re-evaluation  - f/u Ucx and blood cx  - check resp cx  - check urine legionella antigen         Encephalopathy    - ddx 2/2 sepsis vs JACEY with electrolyte disturbances vs possibly CNS infection such as meningitis (less likely but still a possibility at this time) vs possible seizure (less likely) vs possible component of opioid OD (given utox and AMS improvement with narcan). CT head wnl   - now improving with IVF  - treat sepsis as below  - treat JACEY and electrolyte derangement as below  - will consider an LP over night  - seizure precautions  - vEEG 24 hrs to r/u seizures  - check VBG to r/o hypercapnia given concern for opioid OD  - deferring Neurology consult until the AM based on discretion of primary team tomorrow        Rhabdomyolysis    - CPK of 1240 --> 1740. Likely 2/2 immobility  - IVF gtt at above  - check CPK in the AM to ensure downtrend        JACEY (acute kidney injury)    - likely pre-renal given volume depletion/ dehydration. May be 2/2 possible rhabdo component   - Cr 3.5 --> 2.5 --> 1.3 resolving with IVF  - cont NS ggt at 150 cc/hr  - check urine lytes although unlikely to be diagnostic after pt received copious amt of IVF at OSH        Metabolic acidosis, normal anion gap (NAG)    - likely mixed 2/2 ARF as well as vomiting  - resolving now  - check VBG  - check urine lytes as above          Hyperkalemia    - 7.1 --> 6.1 --> 5.1 2/2 JACEY and possibly rhabdo  - hold further shifting and treat JACEY/ rhabdo as above          HTN (hypertension)    - prn hydralazine for SBP >180 at this time  - cont to monitor            VTE Risk Mitigation          Ordered     Medium Risk of VTE  Once      06/03/17 2147     Place sequential compression device  Until discontinued      06/03/17 2147        Yani Cardenas MD  Department of Hospital Medicine   [facility]

## 2017-06-04 NOTE — ASSESSMENT & PLAN NOTE
- likely pre-renal given volume depletion/ dehydration. May be 2/2 possible rhabdo component   - Cr 3.5 --> 2.5 --> 1.3 resolving with IVF  - cont NS ggt at 150 cc/hr  - check urine lytes although unlikely to be diagnostic after pt received copious amt of IVF at OSH

## 2017-06-04 NOTE — PROGRESS NOTES
Stool sample collected and it is only mucous with bright blood , small amount , MD notified and he came and looked to the sample , he order to keep monitoring the pt no other orders at this time . Will keep monitoring .

## 2017-06-04 NOTE — HPI
"57 y/o F with hx of HTN (on lisinopril and atenolol) ,smoker, and with chronic back pain (s/p 3 surgeries, on opioids for pain) presents to  [facility] as a transfer from [facility] ED. Patient was brought to [facility] ED after she was found confused and obtunded on her sofa at home. Daughter states that patient may have vomited. She also notes jerking motions that have been concerning her for seizures. There is concern that pt may have taken opioids, although pt denies. Patient states that she likely hit the back of her head as she is having occipital HA and a "bump". + blurred vision. She also notes dry/ cracked lips (possibily of biting her tongue). Pt also states she has been having a cough for the past few days. Last time pt was seen within her normal states of health was on 6/2 around 5 pm, on 6/3 around 10AM she was found on the sofa and altered. The day prior to that pt was riding her bicycle (less than 1 mi distance). Pt denies any hx of seizures, recent f/v, abd pain, dysuria, paresthesias, neuro deficits, CP or SOB    At [facility] ED, patient was found to be hypotensive/ hypovolemic, in JACEY with Cr of 3.5, K+ of 7.1, bicarb of 18, CPK of 1240. WBC Of 21K. CT head was unremarkable. UA with evidence of UTI. She received IV rocephin and 4L NS with improvement in her encephalopathy. Her K improved from 7.1 --> 6.1 after shifting with insulin and albuterol. And her Cr improved from 3.5 --> 2.5 with IVF. On admission at [facility], pt was communicating appropriately, with no neuro deficits.   "

## 2017-06-04 NOTE — PROGRESS NOTES
Pt left the floor for U/s . On stretcher with transporter . Pt is awake ,alert and oriented x 4 .

## 2017-06-04 NOTE — ASSESSMENT & PLAN NOTE
- ddx 2/2 sepsis vs JACEY with electrolyte disturbances vs possibly CNS infection such as meningitis (less likely but still a possibility at this time) vs possible seizure (less likely). CT head wnl  - now improving with IVF  - treat sepsis as below  - treat JACEY and electrolyte derangement as below  - will consider an LP over night  - seizure precautions  - vEEG 24 hrs to r/u seizures  - check VBG to r/o hypercapnia  - deferring Neurology consult until the AM based on discretion of primary team tomorrow

## 2017-06-04 NOTE — ASSESSMENT & PLAN NOTE
- likely mixed 2/2 ARF as well as vomiting  - resolving now  - check VBG  - check urine lytes as above

## 2017-06-04 NOTE — ASSESSMENT & PLAN NOTE
- 7.1 --> 6.1 --> 5.1 2/2 JACEY and possibly rhabdo  - hold further shifting and treat JACEY/ rhabdo as above

## 2017-06-04 NOTE — PROGRESS NOTES
Pt is back to the floor , MD at the bedside , V/S checked , pt's /101 , MD notified and he said he will will order some BP medication . Will keep monitoring .

## 2017-06-04 NOTE — HOSPITAL COURSE
Patient was admitted to hospital medicine 3. IVF were stopped and patient's mentation was normal by visit of the next morning. During the day patient began endorsing watery diarrheal events that were reported to be lightly bloody. Upon examination of the sample, appeared to look watery with pinkish tinge as light hematuria. Suspicious that patient may have voided urine during bowel events, although patient denied this occurring, as appearance could very well be urine secondary to rhabdomyolysis. H/H was checked and was found to be stable. Blood cultures from 6/4 were positive for gram positive rods. LP on 6/7 found glucose of 65 and protein of 26. RBC were elevated at 205 with WBC of 1. CSF cultures NGTD.

## 2017-06-04 NOTE — PROGRESS NOTES
Pt reported that she has diarrhea  About 5 times during the day today and it is watery , and bloody , pt reported she has more episodes last night too .  MD notified and he order stool sample for  C-diff . Will keep monitoring .

## 2017-06-04 NOTE — PHARMACY MED REC
"MedMined Medication Reconciliation  Template    Admission Medication Reconciliation - Pharmacy Consult Note    The home medication history was taken by Liz Alvarado, Pharmacy Technician.  Based on information gathered and subsequent review by the clinical pharmacist, the items below may need attention.     You may go to "Admission" then "Reconcile Home Medications" tabs to review and/or act upon these items. Based on information gathered and subsequent review by the clinical pharmacist, the items below may need attention.    Potentially problematic discrepancies with current MAR  o Patient IS taking the following which was not ordered upon admit  o Lisinopril 10 po daily - when clinically indicated with resolution of JACEY     Please address this information as you see fit.  Feel free to contact us if you have any questions or require assistance.    Christine Pierce, PharmD, PGY-1 Pharmacy Resident   [id number]    Patient's prior to admission medication regimen was as follows:  Prescriptions Prior to Admission   Medication Sig Dispense Refill Last Dose    atenolol (TENORMIN) 25 MG tablet Take 25 mg by mouth once daily.       hydrocodone-acetaminophen 10-325mg (NORCO)  mg Tab Take 1 tablet by mouth every 8 (eight) hours as needed for Pain.        lisinopril 10 MG tablet Take 10 mg by mouth once daily.            Please add appropriate    SmartPhrase below:        "

## 2017-06-04 NOTE — SUBJECTIVE & OBJECTIVE
Past Medical History:   Diagnosis Date    Chronic back pain     HTN (hypertension)        Past Surgical History:   Procedure Laterality Date    BACK SURGERY      X 3       Review of patient's allergies indicates:  No Known Allergies    Current Facility-Administered Medications on File Prior to Encounter   Medication    [COMPLETED] 0.9%  NaCl infusion    [COMPLETED] 0.9%  NaCl infusion    [COMPLETED] 0.9%  NaCl infusion    [COMPLETED] 0.9%  NaCl infusion    [COMPLETED] albuterol sulfate nebulizer solution 10 mg    [COMPLETED] cefTRIAXone (ROCEPHIN) 1 g in dextrose 5 % 50 mL IVPB    [COMPLETED] insulin regular injection 5 Units    [COMPLETED] naloxone 0.4 mg/mL injection 0.4 mg    [COMPLETED] sodium polystyrene 15 gram/60 mL suspension 15 g    [DISCONTINUED] albuterol sulfate 2.5 mg/0.5 mL nebulizer solution    [DISCONTINUED] dextrose 50% injection 25 g     Current Outpatient Prescriptions on File Prior to Encounter   Medication Sig    hydrocodone-acetaminophen 10-325mg (NORCO)  mg Tab Take by mouth.     Family History     Problem Relation (Age of Onset)    Heart disease Mother, Father        Social History Main Topics    Smoking status: Current Every Day Smoker     Types: Cigarettes    Smokeless tobacco: Not on file    Alcohol use No    Drug use:      Types: Hydrocodone    Sexual activity: Not on file     Review of Systems   Constitutional: Negative for chills, diaphoresis, fatigue, fever and unexpected weight change.   HENT: Negative for congestion, rhinorrhea, sinus pressure and tinnitus.    Eyes: Positive for visual disturbance.   Respiratory: Positive for cough. Negative for chest tightness, shortness of breath and wheezing.    Cardiovascular: Negative for chest pain, palpitations and leg swelling.   Gastrointestinal: Positive for nausea and vomiting. Negative for abdominal distention, abdominal pain and diarrhea.   Genitourinary: Negative for decreased urine volume, difficulty  urinating, dysuria and hematuria.   Musculoskeletal: Positive for back pain and neck pain.        Both are chronic in nature   Skin: Negative for rash.   Neurological: Positive for weakness and headaches.   Psychiatric/Behavioral: Positive for confusion.     Objective:     Vital Signs (Most Recent):  Temp: 99.5 °F (37.5 °C) (06/03/17 2159)  Pulse: 96 (06/03/17 2159)  Resp: 15 (06/03/17 2159)  BP: (!) 187/102 (06/03/17 2159)  SpO2: 96 % (06/03/17 2159) Vital Signs (24h Range):  Temp:  [99.5 °F (37.5 °C)] 99.5 °F (37.5 °C)  Pulse:  [89-98] 96  Resp:  [10-27] 15  SpO2:  [91 %-100 %] 96 %  BP: ()/() 187/102        There is no height or weight on file to calculate BMI.    Physical Exam   Constitutional: She is oriented to person, place, and time. She appears well-developed and well-nourished.   Mildly distressed with closed eyes   HENT:   Head: Normocephalic and atraumatic.   Eyes: EOM are normal. Pupils are equal, round, and reactive to light. No scleral icterus.   Neck: Normal range of motion. Neck supple.   Cardiovascular: Normal rate, regular rhythm and normal heart sounds.  Exam reveals no friction rub.    No murmur heard.  Pulmonary/Chest: Effort normal. No respiratory distress. She has no wheezes. She has no rales.   Diffusely decreased breath sounds   Abdominal: Soft. Bowel sounds are normal. There is tenderness. There is guarding.   RUQ tenderness with + mac's   Musculoskeletal: Normal range of motion. She exhibits no edema or tenderness.   Lymphadenopathy:     She has no cervical adenopathy.   Neurological: She is alert and oriented to person, place, and time. No cranial nerve deficit.   Tenderness of posterior neck and lower back. Difficult to appreciate kurnig and brudzinski given chronic neck and back pain that is usually elicited with these maneuvers    Skin: Skin is warm and dry. No rash noted.        Significant Labs:   ABGs: No results for input(s): PH, PCO2, HCO3, POCSATURATED, BE in the  last 48 hours.  Blood Culture: No results for input(s): LABBLOO in the last 48 hours.  CBC:   Recent Labs  Lab 06/03/17  1127 06/03/17  2208   WBC 21.55* 22.76*   HGB 13.1 12.5   HCT 40.7 38.3    150     CMP:   Recent Labs  Lab 06/03/17  1126 06/03/17  1348 06/03/17  2208    142 139   K 7.1* 6.1* 5.1    112* 111*   CO2 18* 18* 20*   * 116* 158*   BUN 72* 63* 39*   CREATININE 3.5* 2.5* 1.3   CALCIUM 9.4 8.4* 8.2*   PROT 7.4  --  6.6   ALBUMIN 3.7  --  3.2*   BILITOT 0.5  --  0.6   ALKPHOS 91  --  103   AST 42*  --  57*   ALT 41  --  41   ANIONGAP 15 12 8   EGFRNONAA 14* 21* 46.0*     Cardiac Markers:   Recent Labs  Lab 06/03/17  1127   *     Lactic Acid:   Recent Labs  Lab 06/03/17  1219 06/03/17  2208   LACTATE 2.0 1.8     Lipase: No results for input(s): LIPASE in the last 48 hours.  Respiratory Culture: No results for input(s): GSRESP, RESPIRATORYC in the last 48 hours.  Troponin:   Recent Labs  Lab 06/03/17  1126   TROPONINI <0.006     TSH:   Recent Labs  Lab 06/03/17  1126   TSH 0.660      Ref. Range 6/3/2017 11:26 6/3/2017 22:08   CPK Latest Ref Range: 20 - 180 U/L 1246 (H) 1720 (H)     Urine Culture: No results for input(s): LABURIN in the last 48 hours.  Urine Studies:   Recent Labs  Lab 06/03/17  1144   COLORU [place]*   APPEARANCEUA Clear   PHUR 5.0   SPECGRAV 1.025   PROTEINUA Trace*   GLUCUA Negative   KETONESU Trace*   BILIRUBINUA 1+*   OCCULTUA Negative   NITRITE Negative   UROBILINOGEN Negative   LEUKOCYTESUR Trace*   RBCUA 5*   WBCUA 40*   BACTERIA Moderate*       Significant Imaging:  CT head   No acute cardiopulmonary abnormalities appreciated radiographically.

## 2017-06-04 NOTE — PHARMACY MED REC
[facility] Medication Reconciliation  Template    Patient was admitted on 6/3/2017 for <principal problem not specified>.      Patient's prior to admission medication regimen was as follows:  Prescriptions Prior to Admission   Medication Sig Dispense Refill Last Dose    atenolol (TENORMIN) 25 MG tablet Take 25 mg by mouth once daily.       CA COMB NO.1/VIT D3/B6/FA/B12 (HEARTBURN & ACID REFLUX ORAL) Take 1 tablet by mouth once daily.       hydrocodone-acetaminophen 10-325mg (NORCO)  mg Tab Take 1 tablet by mouth every 8 (eight) hours as needed for Pain.        lisinopril 10 MG tablet Take 10 mg by mouth once daily.            Please add appropriate    SmartPhrase below:

## 2017-06-05 LAB
ALBUMIN SERPL BCP-MCNC: 2.7 G/DL
ALLENS TEST: ABNORMAL
ALP SERPL-CCNC: 96 U/L
ALT SERPL W/O P-5'-P-CCNC: 37 U/L
ANION GAP SERPL CALC-SCNC: 10 MMOL/L
AST SERPL-CCNC: 41 U/L
BACTERIA #/AREA URNS AUTO: NORMAL /HPF
BASOPHILS # BLD AUTO: 0.01 K/UL
BASOPHILS NFR BLD: 0.1 %
BILIRUB SERPL-MCNC: 0.8 MG/DL
BILIRUB UR QL STRIP: NEGATIVE
BUN SERPL-MCNC: 14 MG/DL
C DIFF GDH STL QL: NEGATIVE
C DIFF TOX A+B STL QL IA: NEGATIVE
CALCIUM SERPL-MCNC: 8.1 MG/DL
CHLORIDE SERPL-SCNC: 109 MMOL/L
CHLORIDE UR-SCNC: 142 MMOL/L
CLARITY UR REFRACT.AUTO: CLEAR
CO2 SERPL-SCNC: 23 MMOL/L
COLOR UR AUTO: ABNORMAL
CREAT SERPL-MCNC: 0.7 MG/DL
CREAT UR-MCNC: 203 MG/DL
DELSYS: ABNORMAL
DIFFERENTIAL METHOD: ABNORMAL
EOSINOPHIL # BLD AUTO: 0 K/UL
EOSINOPHIL NFR BLD: 0.1 %
ERYTHROCYTE [DISTWIDTH] IN BLOOD BY AUTOMATED COUNT: 12.7 %
EST. GFR  (AFRICAN AMERICAN): >60 ML/MIN/1.73 M^2
EST. GFR  (NON AFRICAN AMERICAN): >60 ML/MIN/1.73 M^2
GLUCOSE SERPL-MCNC: 111 MG/DL
GLUCOSE UR QL STRIP: ABNORMAL
HCO3 UR-SCNC: 18.9 MMOL/L (ref 24–28)
HCT VFR BLD AUTO: 37.7 %
HGB BLD-MCNC: 12.6 G/DL
HGB UR QL STRIP: NEGATIVE
KETONES UR QL STRIP: NEGATIVE
LEUKOCYTE ESTERASE UR QL STRIP: ABNORMAL
LYMPHOCYTES # BLD AUTO: 2 K/UL
LYMPHOCYTES NFR BLD: 10.5 %
MCH RBC QN AUTO: 31.5 PG
MCHC RBC AUTO-ENTMCNC: 33.4 %
MCV RBC AUTO: 94 FL
MICROSCOPIC COMMENT: NORMAL
MODE: ABNORMAL
MONOCYTES # BLD AUTO: 1.6 K/UL
MONOCYTES NFR BLD: 8.5 %
NEUTROPHILS # BLD AUTO: 14.9 K/UL
NEUTROPHILS NFR BLD: 80.4 %
NITRITE UR QL STRIP: NEGATIVE
OSMOLALITY UR: 859 MOSM/KG
PCO2 BLDA: 30.5 MMHG (ref 35–45)
PH SMN: 7.4 [PH] (ref 7.35–7.45)
PH UR STRIP: 6 [PH] (ref 5–8)
PLATELET # BLD AUTO: 136 K/UL
PMV BLD AUTO: 10.2 FL
PO2 BLDA: 75 MMHG (ref 80–100)
POC BE: -6 MMOL/L
POC SATURATED O2: 95 % (ref 95–100)
POC TCO2: 20 MMOL/L (ref 23–27)
POTASSIUM SERPL-SCNC: 3.4 MMOL/L
POTASSIUM UR-SCNC: 67 MMOL/L
PROT SERPL-MCNC: 5.9 G/DL
PROT UR QL STRIP: NEGATIVE
RBC # BLD AUTO: 4 M/UL
RBC #/AREA URNS AUTO: 0 /HPF (ref 0–4)
SAMPLE: ABNORMAL
SITE: ABNORMAL
SODIUM SERPL-SCNC: 142 MMOL/L
SODIUM UR-SCNC: 81 MMOL/L
SP GR UR STRIP: 1.02 (ref 1–1.03)
SP02: 94
SQUAMOUS #/AREA URNS AUTO: 4 /HPF
URN SPEC COLLECT METH UR: ABNORMAL
UROBILINOGEN UR STRIP-ACNC: NEGATIVE EU/DL
WBC # BLD AUTO: 18.52 K/UL
WBC #/AREA URNS AUTO: 0 /HPF (ref 0–5)

## 2017-06-05 PROCEDURE — 85025 COMPLETE CBC W/AUTO DIFF WBC: CPT

## 2017-06-05 PROCEDURE — 97162 PT EVAL MOD COMPLEX 30 MIN: CPT

## 2017-06-05 PROCEDURE — 82570 ASSAY OF URINE CREATININE: CPT

## 2017-06-05 PROCEDURE — 83935 ASSAY OF URINE OSMOLALITY: CPT

## 2017-06-05 PROCEDURE — 25000003 PHARM REV CODE 250: Performed by: INTERNAL MEDICINE

## 2017-06-05 PROCEDURE — 86645 CMV ANTIBODY IGM: CPT

## 2017-06-05 PROCEDURE — 99900035 HC TECH TIME PER 15 MIN (STAT)

## 2017-06-05 PROCEDURE — 87040 BLOOD CULTURE FOR BACTERIA: CPT | Mod: 59

## 2017-06-05 PROCEDURE — 63600175 PHARM REV CODE 636 W HCPCS: Performed by: PHYSICIAN ASSISTANT

## 2017-06-05 PROCEDURE — 11000001 HC ACUTE MED/SURG PRIVATE ROOM

## 2017-06-05 PROCEDURE — 82436 ASSAY OF URINE CHLORIDE: CPT

## 2017-06-05 PROCEDURE — 99499 UNLISTED E&M SERVICE: CPT | Mod: ,,, | Performed by: PHYSICIAN ASSISTANT

## 2017-06-05 PROCEDURE — 87449 NOS EACH ORGANISM AG IA: CPT

## 2017-06-05 PROCEDURE — 97165 OT EVAL LOW COMPLEX 30 MIN: CPT

## 2017-06-05 PROCEDURE — 25000003 PHARM REV CODE 250: Performed by: STUDENT IN AN ORGANIZED HEALTH CARE EDUCATION/TRAINING PROGRAM

## 2017-06-05 PROCEDURE — 81001 URINALYSIS AUTO W/SCOPE: CPT

## 2017-06-05 PROCEDURE — 84145 PROCALCITONIN (PCT): CPT

## 2017-06-05 PROCEDURE — 25000003 PHARM REV CODE 250: Performed by: HOSPITALIST

## 2017-06-05 PROCEDURE — 80053 COMPREHEN METABOLIC PANEL: CPT

## 2017-06-05 PROCEDURE — 63600175 PHARM REV CODE 636 W HCPCS: Performed by: STUDENT IN AN ORGANIZED HEALTH CARE EDUCATION/TRAINING PROGRAM

## 2017-06-05 PROCEDURE — 84133 ASSAY OF URINE POTASSIUM: CPT

## 2017-06-05 PROCEDURE — 84300 ASSAY OF URINE SODIUM: CPT

## 2017-06-05 PROCEDURE — 99233 SBSQ HOSP IP/OBS HIGH 50: CPT | Mod: ,,, | Performed by: INTERNAL MEDICINE

## 2017-06-05 PROCEDURE — 94799 UNLISTED PULMONARY SVC/PX: CPT

## 2017-06-05 PROCEDURE — 25000003 PHARM REV CODE 250: Performed by: PHYSICIAN ASSISTANT

## 2017-06-05 PROCEDURE — 36415 COLL VENOUS BLD VENIPUNCTURE: CPT

## 2017-06-05 PROCEDURE — 87529 HSV DNA AMP PROBE: CPT

## 2017-06-05 RX ORDER — METRONIDAZOLE 500 MG/100ML
500 INJECTION, SOLUTION INTRAVENOUS
Status: DISCONTINUED | OUTPATIENT
Start: 2017-06-05 | End: 2017-06-05

## 2017-06-05 RX ADMIN — TRAMADOL HYDROCHLORIDE 50 MG: 50 TABLET, COATED ORAL at 06:06

## 2017-06-05 RX ADMIN — METRONIDAZOLE 500 MG: 500 INJECTION, SOLUTION INTRAVENOUS at 03:06

## 2017-06-05 RX ADMIN — METRONIDAZOLE 500 MG: 500 INJECTION, SOLUTION INTRAVENOUS at 12:06

## 2017-06-05 RX ADMIN — TRAMADOL HYDROCHLORIDE 50 MG: 50 TABLET, COATED ORAL at 12:06

## 2017-06-05 RX ADMIN — HEPARIN SODIUM 5000 UNITS: 5000 INJECTION, SOLUTION INTRAVENOUS; SUBCUTANEOUS at 05:06

## 2017-06-05 RX ADMIN — HEPARIN SODIUM 5000 UNITS: 5000 INJECTION, SOLUTION INTRAVENOUS; SUBCUTANEOUS at 12:06

## 2017-06-05 RX ADMIN — CEFTRIAXONE 2 G: 2 INJECTION, SOLUTION INTRAVENOUS at 06:06

## 2017-06-05 RX ADMIN — POTASSIUM BICARBONATE 50 MEQ: 25 TABLET, EFFERVESCENT ORAL at 03:06

## 2017-06-05 RX ADMIN — CARVEDILOL 6.25 MG: 6.25 TABLET, FILM COATED ORAL at 09:06

## 2017-06-05 RX ADMIN — ACETAMINOPHEN 650 MG: 325 TABLET ORAL at 11:06

## 2017-06-05 RX ADMIN — METRONIDAZOLE 500 MG: 500 INJECTION, SOLUTION INTRAVENOUS at 07:06

## 2017-06-05 RX ADMIN — HEPARIN SODIUM 5000 UNITS: 5000 INJECTION, SOLUTION INTRAVENOUS; SUBCUTANEOUS at 09:06

## 2017-06-05 RX ADMIN — AMPICILLIN SODIUM 2 G: 2 INJECTION, POWDER, FOR SOLUTION INTRAMUSCULAR; INTRAVENOUS at 11:06

## 2017-06-05 RX ADMIN — TRAMADOL HYDROCHLORIDE 50 MG: 50 TABLET, COATED ORAL at 05:06

## 2017-06-05 RX ADMIN — CEFTRIAXONE 1 G: 1 INJECTION, SOLUTION INTRAVENOUS at 12:06

## 2017-06-05 RX ADMIN — CARVEDILOL 6.25 MG: 6.25 TABLET, FILM COATED ORAL at 07:06

## 2017-06-05 RX ADMIN — VANCOMYCIN HYDROCHLORIDE 1250 MG: 5 INJECTION, POWDER, LYOPHILIZED, FOR SOLUTION INTRAVENOUS at 11:06

## 2017-06-05 RX ADMIN — ACYCLOVIR SODIUM 460 MG: 50 INJECTION, SOLUTION INTRAVENOUS at 10:06

## 2017-06-05 NOTE — ASSESSMENT & PLAN NOTE
- Multiple episodes on 6/4, appears as hematuria. Watery with pinkish hue.  - Stable H/H.  - Suspicious that patient is voiding urine during BM as there is no formed stools and consistency is watery with clarity.  - Likely viral gastroenteritis, but continue IV flagyl at this time to cover for possible enteritis and sepsis and leukocytosis on admission that could have involvement of bowels.  - Cdiff pending.

## 2017-06-05 NOTE — PT/OT/SLP EVAL
"Occupational Therapy  Evaluation and D/C    Lien Sotelo   MRN: [id number]   Admitting Diagnosis: Sepsis    OT Date of Treatment: 06/05/17   OT Start Time: 1059  OT Stop Time: 1115  OT Total Time (min): 16 min    Billable Minutes:  Evaluation 16    Diagnosis: Sepsis     Past Medical History:   Diagnosis Date    Chronic back pain     HTN (hypertension)       Past Surgical History:   Procedure Laterality Date    BACK SURGERY      X 3       Referring physician: Anil Duke MD  Date referred to OT: 6-4-17    General Precautions: Standard, fall, aspiration  Orthopedic Precautions: N/A  Braces: N/A    Do you have any cultural, spiritual, Lutheran conflicts, given your current situation?: none     Patient History:  Living Environment  Lives With: alone  Living Arrangements: mobile home  Home Accessibility: stairs to enter home  Number of Stairs to Enter Home: 2  Stair Railings at Home: outside, present on left side  Transportation Available: family or friend will provide  Living Environment Comment: Pt lives alone in a camper with 2 MARIA [address]IA with handrail on the L.  PTA pt was independent with all ADLs and IADLs but did not drive.   Equipment Currently Used at Home: none    Prior level of function:   Bed Mobility/Transfers: independent  Grooming: independent  Bathing: independent  Upper Body Dressing: independent  Lower Body Dressing: independent  Toileting: independent  Home Management Skills: independent  Homemaking Responsibilities: Yes  Driving License: No  Mode of Transportation: Family, Friends     Dominant hand: right    Subjective:  Communicated with RN prior to session.  "y'all don't know how good this feels to wash my face."  Chief Complaint: none  Patient/Family stated goals: return to home    Pain/Comfort  Pain Rating 1: 0/10    Objective:  Patient found with: peripheral IV, telemetry    Cognitive Exam:  Oriented to: Person, Place and Situation  Follows Commands/attention: Follows multistep  " commands  Communication: clear/fluent  Memory:  No Deficits noted  Safety awareness/insight to disability: intact  Coping skills/emotional control: Appropriate to situation    Visual/perceptual:  Intact    Physical Exam:  Postural examination/scapula alignment: No postural abnormalities identified  Skin integrity: Visible skin intact  Edema: None noted     Sensation:   Intact    Upper Extremity Range of Motion:  Right Upper Extremity: WNL  Left Upper Extremity: WNL    Upper Extremity Strength:  Right Upper Extremity: WNL  Left Upper Extremity: WNL   Strength: WNL    Fine motor coordination:   Intact    Gross motor coordination: WFL    Functional Mobility:  Bed Mobility:  Rolling/Turning to Left:  (Pt found in bathroom upon arrival)    Transfers:  Toilet Transfer Technique: Other (see comments) (Pt was on commode upon arrival and performed toileting to completion)  Toilet Transfer Assistance: Independent  Toilet Transfer Assistive Device: No Assistive Device  Tub Transfer Technique: Stand Pivot  Tub Bench Transfer Assistance: Modified Independent  Tub Transfer Assistive Device: Other (see comments) (x2 trials, once with grab bar, once using wall to stabilize)    Functional Ambulation: Pt ambulated aprox. 20 ft to and from bathroom with no AD and (I). No LOB or SOB    Activities of Daily Living:  Feeding Level of Assistance: Activity did not occur  UE Dressing Level of Assistance: Independent (standing; donning gown as robe.)  LE Dressing Level of Assistance: Independent (sitting EOB to allie underwear)  Grooming Position: Standing at sink  Grooming Level of Assistance: Independent (brushing teeth; washing face.)  Toileting Where Assessed: Toilet (Pt on toilet upon arrival and performed toileting to completion)  Toileting Level of Assistance: Independent  Bathing Level of Assistance: Activity did not occur    Balance:   Static Sit: NORMAL: No deviations seen in posture held statically  Dynamic Sit: GOOD+: Maintains  "balance through MAXIMAL excursions of active trunk motion  Static Stand: GOOD+: Takes MAXIMAL challenges from all directions  Dynamic stand: GOOD+: Independent gait (with or without assistive device)    Therapeutic Activities and Exercises:  Pt educated on role of OT, and D/C from OT.    AM-PAC 6 CLICK ADL  How much help from another person does this patient currently need?  1 = Unable, Total/Dependent Assistance  2 = A lot, Maximum/Moderate Assistance  3 = A little, Minimum/Contact Guard/Supervision  4 = None, Modified [place]/Independent    Putting on and taking off regular lower body clothing? : 4  Bathing (including washing, rinsing, drying)?: 4  Toileting, which includes using toilet, bedpan, or urinal? : 4  Putting on and taking off regular upper body clothing?: 4  Taking care of personal grooming such as brushing teeth?: 4  Eating meals?: 4  Total Score: 24    AM-PAC Raw Score CMS "G-Code Modifier Level of Impairment Assistance   6 % Total / Unable   7 - 9 CM 80 - 100% Maximal Assist   10-14 CL 60 - 80% Moderate Assist   15 - 19 CK 40 - 60% Moderate Assist   20 - 22 CJ 20 - 40% Minimal Assist   23 CI 1-20% SBA / CGA   24 CH 0% Independent/ Mod I       Patient left up in chair with all lines intact and call button in reach    Assessment:  Lien Sotelo is a 56 y.o. female with a medical diagnosis of Sepsis and presents with no noted impairments hindering functional performance. Pt tolerated eval well with good motivation. Pt is currently performing ADLs, functional mobility & t/fs without assistance and displays age-appropriate strength, endurance & balance. OT services are not recommended at this time and patient is safe to D/C home.    Pt presented with a low complexity OT evaluation.  Pt required a brief an expanded review of medical history and occupational profile.  Pt demod 1-3 performance deficits (physical, cognitive, or psychosocial) resulting in limitations and engagement restrictions. "  Clinical decision making required low analytical complexity with limited treatment options.  Pt with no cormorbidities and required no modification of task/assistance with assessment.      Physical- skills refer to impairments of body structure or functions, balance, mobility; strength, endurance, FMC, GMC, sensation, dexterity, and posture.  Cognitive- skills refer to ability to attend, communicate, perceive, think, understand, problem solve, mentally sequence, learn, and remember resulting in ability to organize occupational performance in timely and safe manner.    Psychosocial- skills refer to interpersonal interactions, habits, routines, and behaviors, active use of coping strategies, and environmental adaptations to appropriately participate in everyday tasks and situations.       Rehab identified problem list/impairments: Rehab identified problem list/impairments:  (none noted)    Rehab potential is excellent.    Activity tolerance: Excellent    Discharge recommendations: Discharge Facility/Level Of Care Needs: home     Barriers to discharge: Barriers to Discharge: Inaccessible home environment    Equipment recommendations: none     GOALS:    Occupational Therapy Goals        Problem: Occupational Therapy Goal    Goal Priority Disciplines Outcome Interventions   Occupational Therapy Goal     OT, PT/OT     Description:  No goals set.                      PLAN:  Patient to be seen  (D/C) to address the above listed problems via  (D/C)  Plan of Care expires: 06/05/17  Plan of Care reviewed with: patient         NOREEN Victor  06/05/2017

## 2017-06-05 NOTE — PT/OT/SLP EVAL
Physical Therapy   Evaluation/Discharge    Lien Sotelo   MRN: [id number]     PT Received On: 06/05/17  PT Start Time: 1100  PT Stop Time: 1115  PT Total Time (min): 15 min    Billable Minutes:  Evaluation 15    Diagnosis: Sepsis  Past Medical History:   Diagnosis Date    Chronic back pain     HTN (hypertension)      Past Surgical History:   Procedure Laterality Date    BACK SURGERY      X 3       Referring physician: Serjio  Date referred to PT: 6/5/2017     General Precautions: Standard, fall, aspiration  Orthopedic Precautions : N/A  Braces: N/A    Do you have any cultural, spiritual, Gnosticism conflicts, given your current situation?: none    Patient History:  Lives With: alone  Living Arrangements: mobile home  Home Accessibility: stairs to enter home  Number of Stairs to Enter Home: 2  Stair Railings at Home: outside, present on left side  Transportation Available: family or friend will provide  Living Environment Comment: Pt lives alone in a camper with 2 [address] with handrail on the L.  PTA pt was independent with all ADLs and IADLs but did not drive.   Equipment Currently Used at Home: none    Previous Level of Function:  Ambulation Skills: independent  Transfer Skills: independent  ADL Skills: independent    Subjective:  Communicated with RN prior to session.    Pt agreeable to PT evaluation     Chief Complaint: none stated  Patient goals: to return home safely     Pain Rating 1: 0/10   Pain Rating Post-Intervention 1: 0/10    Objective:  Patient found supine in bed     Patient found with: peripheral IV, telemetry    Cognitive Exam:  Oriented to: Person, Place, Time and Situation  Follows Commands/attention: Follows multistep  commands  Communication: clear/fluent  Safety awareness/insight to disability: intact    Physical Exam:  Postural examination/scapula alignment: Rounded shoulder    Skin integrity: Visible skin intact  Edema: None noted     Sensation:   Intact    Lower Extremity Range of  Motion:  Right Lower Extremity: WFL  Left Lower Extremity: WFL    Lower Extremity Strength:  Right Lower Extremity: WFL  Left Lower Extremity: WFL    Functional Mobility:    Bed Mobility:  Scooting/Bridging: Modified Independent  Supine to Sit: Modified Independent  Sit to Supine: Modified Independent    Transfers:    Sit <> Stand Assistance: Supervision  Sit <> Stand Assistive Device: No Assistive Device    Ambulation:    Gait Distance: ~200 feet with no LOB or SOB.    Assistance 1: Supervision  Gait Assistive Device: No device  Gait Pattern: reciprocal    Stairs:  Pt ascended/descend 3 stair(s) with No Assistive Device with left railing with Contact Guard Assistance.     Balance:   Static Sit: NORMAL: No deviations seen in posture held statically  Dynamic Sit:  NORMAL: No deviations seen in posture held dynamically  Static Stand: NORMAL: No deviations seen in posture held statically  Dynamic stand: NORMAL: No deviations seen in posture held dynamically    Therapeutic Activities and Exercises:  PT evaluation performed.  White board updated.  Pt educated on role of PT, safety with functional mobility.     Patient left up in chair with all lines intact, call button in reach and RN notified.    AM-PAC 6 CLICK MOBILITY  Turning over in bed (including adjusting bedclothes, sheets and blankets)?: 4  Sitting down on and standing up from a chair with arms (e.g., wheelchair, bedside commode, etc.): 4  Moving from lying on back to sitting on the side of the bed?: 4  Moving to and from a bed to a chair (including a wheelchair)?: 4  Need to walk in hospital room?: 3  Climbing 3-5 steps with a railing?: 3  Total Score: 22    AM-PAC Raw Score   CMS G-Code Modifier   Level of Impairment   Assistance     6   CN   100%         Total / Unable   7 - 9   CM   80 - 100%   Maximal Assist     10 - 14   CL   60 - 80%   Moderate Assist     15 - 19   CK   40 - 60%   Moderate Assist     20 - 22   CJ   20 - 40%   Minimal Assist     23    CI   1-20%         SBA / CGA     24 CH   0%   Independent/Modified Independent       Assessment:  Lien Sotelo is a 56 y.o. female with a medical diagnosis of Sepsis. Pt is functioning at a high level and does not require further acute PT services at this time.  Please reconsult if situation changes.     Discharge Facility/Level Of Care Needs: home    Barriers to discharge: None    Equipment Needed After Discharge: none    GOALS:   None on file     PLAN:    D/C acute PT services   Plan of Care reviewed with: patient    Justin Ortiz, PT, DPT  6/5/2017   [phone]

## 2017-06-05 NOTE — SUBJECTIVE & OBJECTIVE
"Interval History: Developed diarrhea overnight. Denies any new pain. Patient still confused around inciting events and complains of having a sore tongue.     Review of Systems   Constitutional: Negative for activity change, appetite change, chills, diaphoresis, fatigue, fever and unexpected weight change.   Eyes: Positive for visual disturbance (blurry vision). Negative for photophobia, pain and redness.   Respiratory: Negative for cough and shortness of breath.    Cardiovascular: Negative for chest pain.   Gastrointestinal: Positive for diarrhea and nausea. Negative for abdominal pain and vomiting.   Endocrine: Negative for cold intolerance and heat intolerance.   Genitourinary: Negative for dysuria.   Musculoskeletal: Negative for arthralgias and myalgias.   Skin: Negative for rash and wound.   Neurological: Positive for headaches. Negative for dizziness, weakness and numbness.   Psychiatric/Behavioral: Negative for confusion.     Objective:     Vital Signs (Most Recent):  Temp: 99 °F (37.2 °C) (06/05/17 1124)  Pulse: 82 (06/05/17 1206)  Resp: 18 (06/05/17 1206)  BP: (!) 159/113 (06/05/17 1124)  SpO2: 96 % (06/05/17 1206) Vital Signs (24h Range):  Temp:  [98.7 °F (37.1 °C)-99 °F (37.2 °C)] 99 °F (37.2 °C)  Pulse:  [] 82  Resp:  [17-18] 18  SpO2:  [90 %-100 %] 96 %  BP: (157-169)/() 159/113        There is no height or weight on file to calculate BMI.    Intake/Output Summary (Last 24 hours) at 06/05/17 1548  Last data filed at 06/05/17 1300   Gross per 24 hour   Intake              720 ml   Output                0 ml   Net              720 ml      Physical Exam   Constitutional: She is oriented to person, place, and time. She appears well-developed and well-nourished. No distress.   HENT:   2-3 "knot", tender to palpation in occipital region    Eyes: Conjunctivae and EOM are normal. Pupils are equal, round, and reactive to light. No scleral icterus.   Neck: Normal range of motion. Neck supple. "   Cardiovascular: Regular rhythm, normal heart sounds and intact distal pulses.    Pulmonary/Chest: Effort normal and breath sounds normal.   Abdominal: Soft. Bowel sounds are normal.   Musculoskeletal: Normal range of motion. She exhibits no edema or tenderness.   Neurological: She is alert and oriented to person, place, and time. She has normal reflexes.   Skin: She is not diaphoretic.   Psychiatric: She has a normal mood and affect. Her behavior is normal. Judgment and thought content normal.       Significant Labs:   CBC:   Recent Labs  Lab 06/04/17  0407 06/04/17  1753 06/05/17  0420   WBC 22.18* 21.05* 18.52*   HGB 12.8 13.1 12.6   HCT 38.7 39.1 37.7   * 138* 136*     CMP:   Recent Labs  Lab 06/03/17  2208 06/04/17  0407 06/05/17  0420    138 142   K 5.1 4.1 3.4*   * 111* 109   CO2 20* 19* 23   * 165* 111*   BUN 39* 28* 14   CREATININE 1.3 0.8 0.7   CALCIUM 8.2* 8.1* 8.1*   PROT 6.6 6.3 5.9*   ALBUMIN 3.2* 3.0* 2.7*   BILITOT 0.6 0.5 0.8   ALKPHOS 103 98 96   AST 57* 51* 41*   ALT 41 40 37   ANIONGAP 8 8 10   EGFRNONAA 46.0* >60.0 >60.0

## 2017-06-05 NOTE — ASSESSMENT & PLAN NOTE
- Likely multifactorial, including septic encephalopathy, uremic encephalopathy, and polypharmacy with opiates. On admission -    - With UA suspicious for UTI with leukocytosis   - Uremia at 71 with JACEY.   - Presumptive positive for opiates (patient takes Norco at home).  - Meningitis was considered but patient's mentation has resolved to baseline and without nuchal rigidity.  - Suspicious for seizure as well, although patient has no known seizure history  - Now improving with IVF to treat JACEY/rhabdo and with sepsis therapy.  - Seizure precautions  - vEEG 24 hrs to r/u seizures  - Deferring Neurology consult for now pending video EEG.

## 2017-06-05 NOTE — PLAN OF CARE
06/05/17 1157   Discharge Assessment   Assessment Type Discharge Planning Assessment   Confirmed/corrected address and phone number on facesheet? Yes   Assessment information obtained from? Patient;Medical Record   Expected Length of Stay (days) 3   Communicated expected length of stay with patient/caregiver yes   Prior to hospitilization cognitive status: Alert/Oriented   Prior to hospitalization functional status: Independent   Current cognitive status: Alert/Oriented   Current Functional Status: Independent   Lives With alone  (daughter lives on same property)   Able to Return to Prior Arrangements yes   Is patient able to care for self after discharge? Yes   Patient's perception of discharge disposition home or selfcare   Readmission Within The Last 30 Days no previous admission in last 30 days   Patient currently being followed by outpatient case management? No   Patient currently receives home health services? No   Does the patient currently use HME? No   Patient currently receives private duty nursing? No   Patient currently receives any other outside agency services? No   Equipment Currently Used at Home none   Do you have any problems affording any of your prescribed medications? No   Is the patient taking medications as prescribed? yes   Do you have any financial concerns preventing you from receiving the healthcare you need? No   Does the patient have transportation to healthcare appointments? Yes   Transportation Available family or friend will provide   On Dialysis? No   Are there any open cases? No   Discharge Plan A Home with family   Discharge Plan B Home Health   Patient/Family In Agreement With Plan yes

## 2017-06-05 NOTE — ASSESSMENT & PLAN NOTE
- Likely multifactorial, including septic encephalopathy, uremic encephalopathy, and polypharmacy with opiates (hydrocodone/tramadol). On admission -    - With UA suspicious for UTI with leukocytosis   - Uremia at 71 with JACEY.   - Presumptive positive for opiates (patient takes Norco at home).  - Meningitis was considered but patient's mentation has resolved to baseline and without nuchal rigidity.  - Suspicious for seizure as well, although patient has no known seizure history  - Now improving with IVF to treat JACEY/rhabdo and with sepsis therapy.  - Seizure precautions  - vEEG 24 hrs to r/u seizures   - Concerned about positive blood culture. Consulting ID for further recommendations.

## 2017-06-05 NOTE — CONSULTS
[facility]  Infectious Disease  Consult Note    Patient Name: Lien Sotelo  MRN: [id number]  Admission Date: 6/3/2017  Hospital Length of Stay: 2 days  Attending Physician: Gabby Bassett MD  Primary Care Provider: Primary Doctor No       Inpatient consult to Infectious Diseases  Consult performed by: KEVAN VERDE JR  Consult ordered by: SERGE BARKER      Consult received.  Full consult to follow.    CARI Chamorro  Infectious Disease  [facility]

## 2017-06-05 NOTE — PLAN OF CARE
Problem: Occupational Therapy Goal  Goal: Occupational Therapy Goal  No goals set.    Outcome: Outcome(s) achieved Date Met: 06/05/17  Eval performed, Pt is currently performing ADLs, functional mobility & t/fs without assistance and displays age-appropriate strength, endurance & balance. OT services are not recommended at this time and patient is safe to D/C home.    NOREEN Victor  6/5/2017

## 2017-06-05 NOTE — SUBJECTIVE & OBJECTIVE
Past Medical History:   Diagnosis Date    Chronic back pain     HTN (hypertension)        Past Surgical History:   Procedure Laterality Date    BACK SURGERY      X 3       Review of patient's allergies indicates:  No Known Allergies    Medications:  Prescriptions Prior to Admission   Medication Sig    atenolol (TENORMIN) 25 MG tablet Take 25 mg by mouth once daily.    hydrocodone-acetaminophen 10-325mg (NORCO)  mg Tab Take 1 tablet by mouth every 8 (eight) hours as needed for Pain.     lisinopril 10 MG tablet Take 10 mg by mouth once daily.     Antibiotics     Start     Stop Route Frequency Ordered    06/05/17 1900  ampicillin 2 g in sodium chloride 0.9 % 100 mL IVPB (ready to mix system)      -- IV Every 4 hours (non-standard times) 06/05/17 1801 06/05/17 1845  vancomycin (VANCOCIN) 1,250 mg in dextrose 5 % 250 mL IVPB  (Vancomycin IVPB with levels panel)      -- IV Every 12 hours (non-standard times) 06/05/17 1801 06/05/17 1815  cefTRIAXone (ROCEPHIN) 2 g in dextrose 5 % 50 mL IVPB      -- IV Every 12 hours (non-standard times) 06/05/17 1801        Antifungals     None        Antivirals         Stop Route Frequency     acyclovir (ZOVIRAX) injection      -- IV Every 8 hours (non-standard times)             There is no immunization history on file for this patient.    Family History     Problem Relation (Age of Onset)    Heart disease Mother, Father        Social History     Social History    Marital status:      Spouse name: N/A    Number of children: N/A    Years of education: N/A     Social History Main Topics    Smoking status: Current Every Day Smoker     Types: Cigarettes    Smokeless tobacco: None    Alcohol use No    Drug use:      Types: Hydrocodone    Sexual activity: Not Asked     Other Topics Concern    None     Social History Narrative    None     Review of Systems   Constitutional: Positive for chills and fever. Negative for activity change and diaphoresis.   HENT:  Positive for mouth sores (possible). Negative for congestion, ear pain, rhinorrhea, sore throat, trouble swallowing and voice change.    Eyes: Positive for photophobia. Negative for pain and redness.   Respiratory: Positive for cough (last 3 weeks). Negative for shortness of breath and wheezing.    Cardiovascular: Negative for chest pain, palpitations and leg swelling.   Gastrointestinal: Positive for diarrhea. Negative for abdominal pain, constipation, nausea and vomiting.   Genitourinary: Negative for dysuria, frequency and urgency.   Musculoskeletal: Positive for back pain and neck pain. Negative for arthralgias, joint swelling and myalgias.        All chronic     Neurological: Positive for seizures (possible), syncope and headaches. Negative for dizziness, light-headedness and numbness.   Hematological: Does not bruise/bleed easily.   Psychiatric/Behavioral: Positive for confusion.     Objective:     Vital Signs (Most Recent):  Temp: 98.8 °F (37.1 °C) (06/05/17 1657)  Pulse: 88 (06/05/17 1657)  Resp: 18 (06/05/17 1657)  BP: (!) 163/92 (06/05/17 1657)  SpO2: 98 % (06/05/17 1657) Vital Signs (24h Range):  Temp:  [98.7 °F (37.1 °C)-99 °F (37.2 °C)] 98.8 °F (37.1 °C)  Pulse:  [78-95] 88  Resp:  [17-18] 18  SpO2:  [90 %-100 %] 98 %  BP: (157-169)/() 163/92     Weight: 87.5 kg (192 lb 14.4 oz)  Body mass index is 37.67 kg/m².    Estimated Creatinine Clearance: 88.3 mL/min (based on Cr of 0.7).    Physical Exam   Constitutional: She is oriented to person, place, and time. She appears well-developed and well-nourished. No distress.   HENT:   Head: Normocephalic and atraumatic.       Mouth/Throat: Uvula is midline, oropharynx is clear and moist and mucous membranes are normal. No oral lesions.       Eyes: EOM are normal. Pupils are equal, round, and reactive to light. No scleral icterus.   Cardiovascular: Normal rate, regular rhythm and normal heart sounds.  Exam reveals no gallop and no friction rub.    No murmur  heard.  Pulmonary/Chest: Effort normal and breath sounds normal. No respiratory distress. She has no wheezes. She has no rales.   Abdominal: Soft. Bowel sounds are normal. She exhibits no distension and no mass. There is no hepatosplenomegaly. There is no tenderness. There is no rebound and no guarding.   Musculoskeletal: She exhibits no edema.   Neurological: She is alert and oriented to person, place, and time.   Negative Kernig/Brudzinski   Skin: Skin is warm, dry and intact. No rash noted.   Psychiatric: She has a normal mood and affect. Her behavior is normal.   Slightly confused         Significant Labs:   Blood Culture:   Recent Labs  Lab 06/03/17  1219   LABBLOO Gram stain veronika bottle: Gram positive rods   Results called to and read back by:Arely Jorgensen RN 06/05/2017  04:16  No Growth to date  No Growth to date     CBC:   Recent Labs  Lab 06/04/17  0407 06/04/17  1753 06/05/17  0420   WBC 22.18* 21.05* 18.52*   HGB 12.8 13.1 12.6   HCT 38.7 39.1 37.7   * 138* 136*     CMP:   Recent Labs  Lab 06/03/17  2208 06/04/17  0407 06/05/17  0420    138 142   K 5.1 4.1 3.4*   * 111* 109   CO2 20* 19* 23   * 165* 111*   BUN 39* 28* 14   CREATININE 1.3 0.8 0.7   CALCIUM 8.2* 8.1* 8.1*   PROT 6.6 6.3 5.9*   ALBUMIN 3.2* 3.0* 2.7*   BILITOT 0.6 0.5 0.8   ALKPHOS 103 98 96   AST 57* 51* 41*   ALT 41 40 37   ANIONGAP 8 8 10   EGFRNONAA 46.0* >60.0 >60.0     Urine Culture: No results for input(s): LABURIN in the last 4320 hours.  Urine Studies:   Recent Labs  Lab 06/05/17  1305   COLORU Marybeth   APPEARANCEUA Clear   PHUR 6.0   SPECGRAV 1.025   PROTEINUA Negative   GLUCUA 1+*   KETONESU Negative   BILIRUBINUA Negative   OCCULTUA Negative   NITRITE Negative   UROBILINOGEN Negative   LEUKOCYTESUR Trace*   RBCUA 0   WBCUA 0   BACTERIA Rare   SQUAMEPITHEL 4     All pertinent labs within the past 24 hours have been reviewed.    Significant Imaging: I have reviewed all pertinent imaging  results/findings within the past 24 hours.   X-Ray Chest PA And Lateral [[id number]] Resulted: 06/04/17 1231   Order Status: Completed Updated: 06/04/17 1232   Narrative:     2 views: Heart size is normal.  Lungs are clear.  Bones reveal DJD.   Impression:      No acute process seen.      Electronically signed by: SERENA WHALEY  Date: 06/04/17  Time: 12:31    US Abdomen Limited [[id number]] Resulted: 06/04/17 0904   Order Status: Completed Updated: 06/04/17 0904   Narrative:     Time of Procedure: 06/04/17 07:08:00  Accession # [id number]    Reason for study: Rule out cholecystitis    Comparison: None.    Technique: Limited right upper quadrant ultrasound was performed.    Findings: The liver is enlarged measuring 19.2 cm extending below the costal margin demonstrating findings most suggestive of hepatic steatosis.  No focal hepatic parenchymal abnormality. No intra- or extrahepatic biliary ductal dilatation. The common bile duct measures 0.4 cm.  The gallbladder demonstrates a solitary mobile stone measuring 0.5 cm. No other secondary evidence to suggest acute cholecystitis.  Sonographic Thurston's sign is negative. The visualized portions of the pancreas appear normal. The spleen is normal and measures 8.7 x 3.5 cm. No ascites.   Impression:         Cholelithiasis.    Hepatomegaly and hepatic steatosis.    ______________________________________     Electronically signed by resident: IRINA HERNANDEZ MD  Date: 06/04/17  Time: 08:18

## 2017-06-05 NOTE — ASSESSMENT & PLAN NOTE
- With leukocytosis (>20), JACEY, and altered mentation. Patient denies fever and has remained afebrile.   - Likely source UTI with suspicious UA.   - Treating UTI with ceftriaxone 1 g IV daily.   - CXR and repeat insignificant; corroborated by non-suspicious physical examination.  - Patient began endorsing watery diarrhea, but denied abdominal pain 6/4. Abdominal US with cholelithiasis without cholecystitis and without palpable tenderness or Thurston's sign   - Continue IV flagyl at this present time to treat possible colitis, but likely d/c in AM as low suspicion  - Awaiting urine culture for speciation and antibiotic sensitivities.

## 2017-06-05 NOTE — ASSESSMENT & PLAN NOTE
- With leukocytosis (>20), JACEY, and altered mentation.  - Sepsis resolving, however WBC remains elevated despite antibiotics.   - Consulted ID for further recommendations regarding new blood culture results. Concerned for GI source or possible listeria infection.

## 2017-06-05 NOTE — ASSESSMENT & PLAN NOTE
- Likely pre-renal given volume depletion/ dehydration. May be 2/2 possible rhabdo component   - Cr 3.5 --> 2.5 --> 1.3 -> 0.8 resolved with IVF  - Stopped NS ggt at 150 cc/hr considering patient tolerating PO now and resolved JACEY.  - CK 1720 -->1406  - Daily BMP.

## 2017-06-05 NOTE — SUBJECTIVE & OBJECTIVE
Interval History:   During the day, patient reported 5X small diarrheal events reported as watery and bloody. On investigation of sample, it appeared watery and pinkish in tinge, with minimal mucus without formed stool. Concern that patient urinated during BM. Sent for Cdiff and repeated H/H. Otherwise, patient's mentation back to normal. Denies nausea, vomiting, fevers, chills, shortness of breath, cough, or any abdominal pain.     Review of Systems   Constitutional: Negative for chills, diaphoresis, fatigue, fever and unexpected weight change.   HENT: Negative for congestion, rhinorrhea, sinus pressure and tinnitus.    Eyes: Negative for visual disturbance.   Respiratory: Negative for cough, chest tightness, shortness of breath and wheezing.    Cardiovascular: Negative for chest pain, palpitations and leg swelling.   Gastrointestinal: Negative for abdominal distention, abdominal pain, diarrhea, nausea and vomiting.   Genitourinary: Negative for decreased urine volume, difficulty urinating, dysuria and hematuria.   Musculoskeletal: Positive for back pain and neck pain.        Both are chronic in nature   Skin: Negative for rash.   Neurological: Positive for weakness and headaches.   Psychiatric/Behavioral: Negative for confusion.     Objective:     Vital Signs (Most Recent):  Temp: 98.7 °F (37.1 °C) (06/04/17 1533)  Pulse: 90 (06/04/17 1900)  Resp: 19 (06/04/17 1533)  BP: (!) 168/80 (06/04/17 1533)  SpO2: 95 % (06/04/17 1533) Vital Signs (24h Range):  Temp:  [98.3 °F (36.8 °C)-100 °F (37.8 °C)] 98.7 °F (37.1 °C)  Pulse:  [] 90  Resp:  [14-19] 19  SpO2:  [93 %-96 %] 95 %  BP: (152-196)/() 168/80        There is no height or weight on file to calculate BMI.    Intake/Output Summary (Last 24 hours) at 06/04/17 2108  Last data filed at 06/04/17 1600   Gross per 24 hour   Intake              320 ml   Output                0 ml   Net              320 ml      Physical Exam   Constitutional: She is oriented to  "person, place, and time. She appears well-developed and well-nourished.   HENT:   Head: Normocephalic and atraumatic.   2-3 "knot", tender to palpation in occipital region   Eyes: EOM are normal. Pupils are equal, round, and reactive to light. No scleral icterus.   Neck: Normal range of motion. Neck supple.   Cardiovascular: Normal rate, regular rhythm and normal heart sounds.  Exam reveals no friction rub.    No murmur heard.  Pulmonary/Chest: Effort normal and breath sounds normal. No respiratory distress. She has no wheezes. She has no rales.   Abdominal: Soft. Bowel sounds are normal. There is no tenderness. There is no guarding.   Musculoskeletal: Normal range of motion. She exhibits no edema or tenderness.   Lymphadenopathy:     She has no cervical adenopathy.   Neurological: She is alert and oriented to person, place, and time. No cranial nerve deficit.       Skin: Skin is warm and dry. No rash noted.       Significant Labs:   BMP:   Recent Labs  Lab 06/03/17 2208 06/04/17  0407   * 165*    138   K 5.1 4.1   * 111*   CO2 20* 19*   BUN 39* 28*   CREATININE 1.3 0.8   CALCIUM 8.2* 8.1*   MG 2.1  --      CBC:   Recent Labs  Lab 06/03/17 2208 06/04/17 0407 06/04/17  1753   WBC 22.76* 22.18* 21.05*   HGB 12.5 12.8 13.1   HCT 38.3 38.7 39.1    137* 138*       Significant Imaging:   Imaging Results          X-Ray Chest PA And Lateral (Final result)  Result time 06/04/17 12:31:39    Final result by Antonino Whaley III, MD (06/04/17 12:31:39)                 Impression:     No acute process seen.      Electronically signed by: ANTONINO WHALEY  Date:     06/04/17  Time:    12:31              Narrative:    2 views: Heart size is normal.  Lungs are clear.  Bones reveal DJD.                             US Abdomen Limited (Final result)  Result time 06/04/17 09:04:33    Final result by Ivett Sousa MD (06/04/17 09:04:33)                 Impression:        Cholelithiasis.    Hepatomegaly and " hepatic steatosis.    ______________________________________     Electronically signed by resident: IRINA HERNANDEZ MD  Date:     06/04/17  Time:    08:18            As the supervising and teaching physician, I personally reviewed the images and resident's interpretation and I agree with the findings.          Electronically signed by: Ivett Sousa  Date:     06/04/17  Time:    09:04              Narrative:    Time of Procedure: 06/04/17 07:08:00  Accession # [id number]    Reason for study: Rule out cholecystitis    Comparison: None.    Technique: Limited right upper quadrant ultrasound was performed.    Findings: The liver is enlarged measuring 19.2 cm extending below the costal margin demonstrating findings most suggestive of hepatic steatosis.  No focal hepatic parenchymal abnormality. No intra- or extrahepatic biliary ductal dilatation. The common bile duct measures 0.4 cm.  The gallbladder demonstrates a solitary mobile stone measuring 0.5 cm. No other secondary evidence to suggest acute cholecystitis.  Sonographic Thurston's sign is negative. The visualized portions of the pancreas appear normal. The spleen is normal and measures 8.7 x 3.5 cm. No ascites.

## 2017-06-05 NOTE — PROGRESS NOTES
"[facility] Hospital Medicine  Progress Note    Patient Name: Lien Sotelo  MRN: [id number]  Patient Class: IP- Inpatient   Admission Date: 6/3/2017  Length of Stay: 1 days  Attending Physician: Gabby Bassett MD  Primary Care Provider: Primary Doctor [facility] Medicine Team: [facility] HOSP MED 3 Anil Duke MD    Subjective:     Principal Problem:Sepsis    HPI:  55 y/o F with hx of HTN (on lisinopril and atenolol ) , smoker, and with chronic back pain (s/p 3 surgeries, on opioids for pain) presents to  [facility] as a transfer from [facility] ED. Patient was brought to [facility] ED after she was found confused and obtunded on her sofa at home. Daughter states that patient may have vomited. She also notes jerking motions that have been concerning her for seizures. There is concern that pt may have taken opioids, although pt denies. Patient states that she likely hit the back of her head as she is having occipital HA and a "bump". + blurred vision. She also notes dry/ cracked lips (possibily of biting her tongue). Pt also states she has been having a cough for the past few days. Last time pt was seen within her normal states of health was on 6/2 around 5 pm, on 6/3 around 10AM she was found on the sofa and altered. The day prior to that pt was riding her bicycle (less than 1 mi distance). Pt denies any hx of seizures, recent f/v, abd pain, dysuria, paresthesias, neuro deficits, CP or SOB    At [facility] ED, patient was found to be hypotensive/ hypovolemic, in JACEY with Cr of 3.5, K+ of 7.1, bicarb of 18, CPK of 1240. WBC Of 21K. CT head was unremarkable. UA with evidence of UTI. She received IV rocephin and 4L NS with improvement in her encephalopathy. Her K improved from 7.1 --> 6.1 after shifting with insulin and albuterol. And her Cr improved from 3.5 --> 2.5 with IVF. On admission at [facility], pt was communicating appropriately, with no neuro deficits.     Hospital Course:  Patient was admitted " to [facility] medicine 3. IVF were stopped and patient's mentation was normal by visit of the next morning. During the day patient began endorsing watery diarrheal events that were reported to be lightly bloody. Upon examination of the sample, appeared to look watery with pinkish tinge as light hematuria. Suspicious that patient may have voided urine during bowel events, although patient denied this occurring, as appearance could very well be urine secondary to rhabdomyolysis. H/H was checked and was found to be stable.    Interval History:   During the day, patient reported 5X small diarrheal events reported as watery and bloody. On investigation of sample, it appeared watery and pinkish in tinge, with minimal mucus without formed stool. Concern that patient urinated during BM. Sent for Cdiff and repeated H/H. Otherwise, patient's mentation back to normal. Denies nausea, vomiting, fevers, chills, shortness of breath, cough, or any abdominal pain.     Review of Systems   Constitutional: Negative for chills, diaphoresis, fatigue, fever and unexpected weight change.   HENT: Negative for congestion, rhinorrhea, sinus pressure and tinnitus.    Eyes: Negative for visual disturbance.   Respiratory: Negative for cough, chest tightness, shortness of breath and wheezing.    Cardiovascular: Negative for chest pain, palpitations and leg swelling.   Gastrointestinal: Negative for abdominal distention, abdominal pain, diarrhea, nausea and vomiting.   Genitourinary: Negative for decreased urine volume, difficulty urinating, dysuria and hematuria.   Musculoskeletal: Positive for back pain and neck pain.        Both are chronic in nature   Skin: Negative for rash.   Neurological: Positive for weakness and headaches.   Psychiatric/Behavioral: Negative for confusion.     Objective:     Vital Signs (Most Recent):  Temp: 98.7 °F (37.1 °C) (06/04/17 1533)  Pulse: 90 (06/04/17 1900)  Resp: 19 (06/04/17 1533)  BP: (!) 168/80 (06/04/17  "1533)  SpO2: 95 % (06/04/17 1533) Vital Signs (24h Range):  Temp:  [98.3 °F (36.8 °C)-100 °F (37.8 °C)] 98.7 °F (37.1 °C)  Pulse:  [] 90  Resp:  [14-19] 19  SpO2:  [93 %-96 %] 95 %  BP: (152-196)/() 168/80        There is no height or weight on file to calculate BMI.    Intake/Output Summary (Last 24 hours) at 06/04/17 2108  Last data filed at 06/04/17 1600   Gross per 24 hour   Intake              320 ml   Output                0 ml   Net              320 ml      Physical Exam   Constitutional: She is oriented to person, place, and time. She appears well-developed and well-nourished.   HENT:   Head: Normocephalic and atraumatic.   2-3 "knot", tender to palpation in occipital region   Eyes: EOM are normal. Pupils are equal, round, and reactive to light. No scleral icterus.   Neck: Normal range of motion. Neck supple.   Cardiovascular: Normal rate, regular rhythm and normal heart sounds.  Exam reveals no friction rub.    No murmur heard.  Pulmonary/Chest: Effort normal and breath sounds normal. No respiratory distress. She has no wheezes. She has no rales.   Abdominal: Soft. Bowel sounds are normal. There is no tenderness. There is no guarding.   Musculoskeletal: Normal range of motion. She exhibits no edema or tenderness.   Lymphadenopathy:     She has no cervical adenopathy.   Neurological: She is alert and oriented to person, place, and time. No cranial nerve deficit.       Skin: Skin is warm and dry. No rash noted.       Significant Labs:   BMP:   Recent Labs  Lab 06/03/17 2208 06/04/17  0407   * 165*    138   K 5.1 4.1   * 111*   CO2 20* 19*   BUN 39* 28*   CREATININE 1.3 0.8   CALCIUM 8.2* 8.1*   MG 2.1  --      CBC:   Recent Labs  Lab 06/03/17 2208 06/04/17  0407 06/04/17  1753   WBC 22.76* 22.18* 21.05*   HGB 12.5 12.8 13.1   HCT 38.3 38.7 39.1    137* 138*       Significant Imaging:   Imaging Results          X-Ray Chest PA And Lateral (Final result)  Result time " 06/04/17 12:31:39    Final result by Antonino Murry III, MD (06/04/17 12:31:39)                 Impression:     No acute process seen.      Electronically signed by: ANTONINO MURRY  Date:     06/04/17  Time:    12:31              Narrative:    2 views: Heart size is normal.  Lungs are clear.  Bones reveal DJD.                             US Abdomen Limited (Final result)  Result time 06/04/17 09:04:33    Final result by Ivett Sousa MD (06/04/17 09:04:33)                 Impression:        Cholelithiasis.    Hepatomegaly and hepatic steatosis.    ______________________________________     Electronically signed by resident: IRINA HERNANDEZ MD  Date:     06/04/17  Time:    08:18            As the supervising and teaching physician, I personally reviewed the images and resident's interpretation and I agree with the findings.          Electronically signed by: Ivett Sousa  Date:     06/04/17  Time:    09:04              Narrative:    Time of Procedure: 06/04/17 07:08:00  Accession # [id number]    Reason for study: Rule out cholecystitis    Comparison: None.    Technique: Limited right upper quadrant ultrasound was performed.    Findings: The liver is enlarged measuring 19.2 cm extending below the costal margin demonstrating findings most suggestive of hepatic steatosis.  No focal hepatic parenchymal abnormality. No intra- or extrahepatic biliary ductal dilatation. The common bile duct measures 0.4 cm.  The gallbladder demonstrates a solitary mobile stone measuring 0.5 cm. No other secondary evidence to suggest acute cholecystitis.  Sonographic Thurston's sign is negative. The visualized portions of the pancreas appear normal. The spleen is normal and measures 8.7 x 3.5 cm. No ascites.                                Assessment/Plan:      * Sepsis    - With leukocytosis (>20), JACEY, and altered mentation. Patient denies fever and has remained afebrile.   - Likely source UTI with suspicious UA.   - Treating UTI with  ceftriaxone 1 g IV daily.   - CXR and repeat insignificant; corroborated by non-suspicious physical examination.  - Patient began endorsing watery diarrhea, but denied abdominal pain 6/4. Abdominal US with cholelithiasis without cholecystitis and without palpable tenderness or Thurston's sign   - Continue IV flagyl at this present time to treat possible colitis, but likely d/c in AM as low suspicion  - Awaiting urine culture for speciation and antibiotic sensitivities.         Acute encephalopathy    - Likely multifactorial, including septic encephalopathy, uremic encephalopathy, and polypharmacy with opiates. On admission -    - With UA suspicious for UTI with leukocytosis   - Uremia at 71 with JACEY.   - Presumptive positive for opiates (patient takes Norco at home).  - Meningitis was considered but patient's mentation has resolved to baseline and without nuchal rigidity.  - Suspicious for seizure as well, although patient has no known seizure history  - Now improving with IVF to treat JACEY/rhabdo and with sepsis therapy.  - Seizure precautions  - vEEG 24 hrs to r/u seizures  - Deferring Neurology consult for now pending video EEG.        JACEY (acute kidney injury)    - Likely pre-renal given volume depletion/ dehydration. May be 2/2 possible rhabdo component   - Cr 3.5 --> 2.5 --> 1.3 -> 0.8 resolved with IVF  - Stopped NS ggt at 150 cc/hr considering patient tolerating PO now and resolved JACEY.  - CK 1720 -->1406  - Daily BMP.         Metabolic acidosis, normal anion gap (NAG)    - Likely mixed 2/2 ARF as well as vomiting  - Resolving now.          Rhabdomyolysis    - CPK of 1240 --> 1740. Likely 2/2 immobility but seizure is not ruled out.   - See JACEY.         Watery diarrhea    - Multiple episodes on 6/4, appears as hematuria. Watery with pinkish hue.  - Stable H/H.  - Suspicious that patient is voiding urine during BM as there is no formed stools and consistency is watery with clarity.  - Likely viral  gastroenteritis, but continue IV flagyl at this time to cover for possible enteritis and sepsis and leukocytosis on admission that could have involvement of bowels.  - Cdiff pending.            HTN (hypertension)    - As atenolol and lisinopril are renally cleared, holding home BP medications.  - Placed on carvedilol 6.25 mg PO BID here.         Hyperkalemia    - 7.1 --> 6.1 --> 5.1 2/2 from rhabdomyolysis and JACEY.   - Resolved with IVF.            VTE Risk Mitigation         Ordered     heparin (porcine) injection 5,000 Units  Every 8 hours     Route:  Subcutaneous        06/04/17 1740     Medium Risk of VTE  Once      06/03/17 2147     Place sequential compression device  Until discontinued      06/03/17 2147        Anil Duke MD  PGY-1 Internal Medicine  [phone]    Department of Hospital Medicine   [facility]

## 2017-06-05 NOTE — HPI
"57 y/o F with hx of HTN (on lisinopril and atenolol ) , smoker, and with chronic back pain (s/p 3 surgeries, on opioids for pain) presents to  [facility] as a transfer from [facility] ED. Patient was brought to [facility] ED after she was found confused and obtunded. Daughter states that patient had vomitus on her. She also notes jerking motions that have been concerning her for seizures. There is concern that pt may have taken opioids, although pt denied. Patient states that she likely hit the back of her head as she is having occipital HA and a "bump". She also notes dry/ cracked lips (possibily of biting her tongue). Pt also states she has been having a cough for the past 3 weeks and has been having diarrhea. She interacts with a dog and days she has received multiple scratches on her arms.  She reports dog has had all its shots.  Last time pt was seen within her normal states of health was on 6/2 around 5 pm, on 6/3 around 10AM she was found on the sofa and altered. The day prior to that pt was riding her bicycle (less than 1 mi distance). Pt denies any hx of seizures, recent f/v, abd pain, dysuria, paresthesias, neuro deficits, CP or SOB, sick contacts, or different eating habits.    At [facility] ED, patient was found to be hypotensive/ hypovolemic, in JACEY with Cr of 3.5, K+ of 7.1, bicarb of 18, CPK of 1240. WBC Of 21K. CT head was unremarkable. UA with evidence of UTI (WBC 40, moderate bacteria but no growth on cultures). She received IV rocephin and 4L NS with improvement in her encephalopathy. Her K improved from 7.1 --> 6.1 after shifting with insulin and albuterol. And her Cr improved from 3.5 --> 2.5 with IVF. On admission at [facility], pt was communicating appropriately, with no neuro deficits.     Currently she complains of global HA, photophobia, feels cold.  She has nausea and diarrhea.  She has neck and back pain that is at he baseline.    "

## 2017-06-05 NOTE — CONSULTS
"[facility]  Infectious Disease  Consult Note    Patient Name: Lien Sotelo  MRN: [id number]  Admission Date: 6/3/2017  Hospital Length of Stay: 2 days  Attending Physician: Gabby Bassett MD  Primary Care Provider: Primary Doctor No     Isolation Status: No active isolations    Patient information was obtained from patient and records.      Consults  Assessment/Plan:     * Sepsis    - concern for meningitis/encephalitis given recent possible seizure, leukocytosis, and gram + rods on blood culture (? Listeria) and tongue lesions (?HSV) or CMV (though much less likely as not immunocompromised.  - rec LP ASAP  - HSV 1/2 and CMV IgM  - repeat blood cultures   - will have blood cultures 6/3 with gram positive rods worked up and speciated - ? Real vs contaminant  - Will broaden abx coverage (Ampicillin, Ceftriaxone meningitis dosing) and Acyclovir (HSV)  - Stable non septic but with leukocytosis and no active signs of meningitis on exam  - discussed with primary team and ID staff  - will follow  - will de-escalate antimicrobials as needed            Thank you for your consult. I will follow-up with patient. Please contact us if you have any additional questions.    CARI Chamorro  Infectious Disease  [facility]    Subjective:     Principal Problem: Sepsis    HPI: 57 y/o F with hx of HTN (on lisinopril and atenolol ) , smoker, and with chronic back pain (s/p 3 surgeries, on opioids for pain) presents to  [facility] as a transfer from [facility] ED. Patient was brought to [facility] ED after she was found confused and obtunded. Daughter states that patient had vomitus on her. She also notes jerking motions that have been concerning her for seizures. There is concern that pt may have taken opioids, although pt denied. Patient states that she likely hit the back of her head as she is having occipital HA and a "bump". She also notes dry/ cracked lips (possibily of biting her " tongue). Pt also states she has been having a cough for the past 3 weeks and has been having diarrhea. She interacts with a dog and days she has received multiple scratches on her arms.  She reports dog has had all its shots.  Last time pt was seen within her normal states of health was on 6/2 around 5 pm, on 6/3 around 10AM she was found on the sofa and altered. The day prior to that pt was riding her bicycle (less than 1 mi distance). Pt denies any hx of seizures, recent f/v, abd pain, dysuria, paresthesias, neuro deficits, CP or SOB, sick contacts, or different eating habits.    At [facility] ED, patient was found to be hypotensive/ hypovolemic, in JACEY with Cr of 3.5, K+ of 7.1, bicarb of 18, CPK of 1240. WBC Of 21K. CT head was unremarkable. UA with evidence of UTI (WBC 40, moderate bacteria but no growth on cultures). She received IV rocephin and 4L NS with improvement in her encephalopathy. Her K improved from 7.1 --> 6.1 after shifting with insulin and albuterol. And her Cr improved from 3.5 --> 2.5 with IVF. On admission at [facility], pt was communicating appropriately, with no neuro deficits.     Currently she complains of global HA, photophobia, feels cold.  She has nausea and diarrhea.  She has neck and back pain that is at he baseline.      Past Medical History:   Diagnosis Date    Chronic back pain     HTN (hypertension)        Past Surgical History:   Procedure Laterality Date    BACK SURGERY      X 3       Review of patient's allergies indicates:  No Known Allergies    Medications:  Prescriptions Prior to Admission   Medication Sig    atenolol (TENORMIN) 25 MG tablet Take 25 mg by mouth once daily.    hydrocodone-acetaminophen 10-325mg (NORCO)  mg Tab Take 1 tablet by mouth every 8 (eight) hours as needed for Pain.     lisinopril 10 MG tablet Take 10 mg by mouth once daily.     Antibiotics     Start     Stop Route Frequency Ordered    06/05/17 1900  ampicillin 2 g in sodium chloride 0.9 % 100 mL  IVPB (ready to mix system)      -- IV Every 4 hours (non-standard times) 06/05/17 1801 06/05/17 1845  vancomycin (VANCOCIN) 1,250 mg in dextrose 5 % 250 mL IVPB  (Vancomycin IVPB with levels panel)      -- IV Every 12 hours (non-standard times) 06/05/17 1801 06/05/17 1815  cefTRIAXone (ROCEPHIN) 2 g in dextrose 5 % 50 mL IVPB      -- IV Every 12 hours (non-standard times) 06/05/17 1801        Antifungals     None        Antivirals         Stop Route Frequency     acyclovir (ZOVIRAX) injection      -- IV Every 8 hours (non-standard times)             There is no immunization history on file for this patient.    Family History     Problem Relation (Age of Onset)    Heart disease Mother, Father        Social History     Social History    Marital status:      Spouse name: N/A    Number of children: N/A    Years of education: N/A     Social History Main Topics    Smoking status: Current Every Day Smoker     Types: Cigarettes    Smokeless tobacco: None    Alcohol use No    Drug use:      Types: Hydrocodone    Sexual activity: Not Asked     Other Topics Concern    None     Social History Narrative    None     Review of Systems   Constitutional: Positive for chills and fever. Negative for activity change and diaphoresis.   HENT: Positive for mouth sores (possible). Negative for congestion, ear pain, rhinorrhea, sore throat, trouble swallowing and voice change.    Eyes: Positive for photophobia. Negative for pain and redness.   Respiratory: Positive for cough (last 3 weeks). Negative for shortness of breath and wheezing.    Cardiovascular: Negative for chest pain, palpitations and leg swelling.   Gastrointestinal: Positive for diarrhea. Negative for abdominal pain, constipation, nausea and vomiting.   Genitourinary: Negative for dysuria, frequency and urgency.   Musculoskeletal: Positive for back pain and neck pain. Negative for arthralgias, joint swelling and myalgias.        All chronic      Neurological: Positive for seizures (possible), syncope and headaches. Negative for dizziness, light-headedness and numbness.   Hematological: Does not bruise/bleed easily.   Psychiatric/Behavioral: Positive for confusion.     Objective:     Vital Signs (Most Recent):  Temp: 98.8 °F (37.1 °C) (06/05/17 1657)  Pulse: 88 (06/05/17 1657)  Resp: 18 (06/05/17 1657)  BP: (!) 163/92 (06/05/17 1657)  SpO2: 98 % (06/05/17 1657) Vital Signs (24h Range):  Temp:  [98.7 °F (37.1 °C)-99 °F (37.2 °C)] 98.8 °F (37.1 °C)  Pulse:  [78-95] 88  Resp:  [17-18] 18  SpO2:  [90 %-100 %] 98 %  BP: (157-169)/() 163/92     Weight: 87.5 kg (192 lb 14.4 oz)  Body mass index is 37.67 kg/m².    Estimated Creatinine Clearance: 88.3 mL/min (based on Cr of 0.7).    Physical Exam   Constitutional: She is oriented to person, place, and time. She appears well-developed and well-nourished. No distress.   HENT:   Head: Normocephalic and atraumatic.       Mouth/Throat: Uvula is midline, oropharynx is clear and moist and mucous membranes are normal. No oral lesions.       Eyes: EOM are normal. Pupils are equal, round, and reactive to light. No scleral icterus.   Cardiovascular: Normal rate, regular rhythm and normal heart sounds.  Exam reveals no gallop and no friction rub.    No murmur heard.  Pulmonary/Chest: Effort normal and breath sounds normal. No respiratory distress. She has no wheezes. She has no rales.   Abdominal: Soft. Bowel sounds are normal. She exhibits no distension and no mass. There is no hepatosplenomegaly. There is no tenderness. There is no rebound and no guarding.   Musculoskeletal: She exhibits no edema.   Neurological: She is alert and oriented to person, place, and time.   Negative Kernig/Brudzinski   Skin: Skin is warm, dry and intact. No rash noted.   Psychiatric: She has a normal mood and affect. Her behavior is normal.   Slightly confused         Significant Labs:   Blood Culture:   Recent Labs  Lab 06/03/17  1211    LADONNA Gram stain veronika bottle: Gram positive rods   Results called to and read back by:Arely Jorgensen RN 06/05/2017  04:16  No Growth to date  No Growth to date     CBC:   Recent Labs  Lab 06/04/17  0407 06/04/17  1753 06/05/17  0420   WBC 22.18* 21.05* 18.52*   HGB 12.8 13.1 12.6   HCT 38.7 39.1 37.7   * 138* 136*     CMP:   Recent Labs  Lab 06/03/17  2208 06/04/17  0407 06/05/17  0420    138 142   K 5.1 4.1 3.4*   * 111* 109   CO2 20* 19* 23   * 165* 111*   BUN 39* 28* 14   CREATININE 1.3 0.8 0.7   CALCIUM 8.2* 8.1* 8.1*   PROT 6.6 6.3 5.9*   ALBUMIN 3.2* 3.0* 2.7*   BILITOT 0.6 0.5 0.8   ALKPHOS 103 98 96   AST 57* 51* 41*   ALT 41 40 37   ANIONGAP 8 8 10   EGFRNONAA 46.0* >60.0 >60.0     Urine Culture: No results for input(s): LABURIN in the last 4320 hours.  Urine Studies:   Recent Labs  Lab 06/05/17  1305   COLORU Marybeth   APPEARANCEUA Clear   PHUR 6.0   SPECGRAV 1.025   PROTEINUA Negative   GLUCUA 1+*   KETONESU Negative   BILIRUBINUA Negative   OCCULTUA Negative   NITRITE Negative   UROBILINOGEN Negative   LEUKOCYTESUR Trace*   RBCUA 0   WBCUA 0   BACTERIA Rare   SQUAMEPITHEL 4     All pertinent labs within the past 24 hours have been reviewed.    Significant Imaging: I have reviewed all pertinent imaging results/findings within the past 24 hours.   X-Ray Chest PA And Lateral [[id number]] Resulted: 06/04/17 1231   Order Status: Completed Updated: 06/04/17 1232   Narrative:     2 views: Heart size is normal.  Lungs are clear.  Bones reveal DJD.   Impression:      No acute process seen.      Electronically signed by: SERENA WHALEY  Date: 06/04/17  Time: 12:31    US Abdomen Limited [[id number]] Resulted: 06/04/17 0904   Order Status: Completed Updated: 06/04/17 0904   Narrative:     Time of Procedure: 06/04/17 07:08:00  Accession # [id number]    Reason for study: Rule out cholecystitis    Comparison: None.    Technique: Limited right upper quadrant ultrasound was  performed.    Findings: The liver is enlarged measuring 19.2 cm extending below the costal margin demonstrating findings most suggestive of hepatic steatosis.  No focal hepatic parenchymal abnormality. No intra- or extrahepatic biliary ductal dilatation. The common bile duct measures 0.4 cm.  The gallbladder demonstrates a solitary mobile stone measuring 0.5 cm. No other secondary evidence to suggest acute cholecystitis.  Sonographic Thurston's sign is negative. The visualized portions of the pancreas appear normal. The spleen is normal and measures 8.7 x 3.5 cm. No ascites.   Impression:         Cholelithiasis.    Hepatomegaly and hepatic steatosis.    ______________________________________     Electronically signed by resident: IRINA HERNANDEZ MD  Date: 06/04/17  Time: 08:18

## 2017-06-05 NOTE — ASSESSMENT & PLAN NOTE
- As atenolol and lisinopril are renally cleared, holding home BP medications.  - Placed on carvedilol 6.25 mg PO BID here.

## 2017-06-05 NOTE — PROGRESS NOTES
"[facility] Hospital Medicine  Progress Note    Patient Name: Lien Sotelo  MRN: [id number]  Patient Class: IP- Inpatient   Admission Date: 6/3/2017  Length of Stay: 2 days  Attending Physician: Gabby Bassett MD  Primary Care Provider: Primary Doctor [facility] Medicine Team: [facility] HOSP MED 3 Abdoulaye Bennett DO    Subjective:     Principal Problem:Sepsis    HPI:  55 y/o F with hx of HTN (on lisinopril and atenolol) ,smoker, and with chronic back pain (s/p 3 surgeries, on opioids for pain) presents to  [facility] as a transfer from [facility] ED. Patient was brought to [facility] ED after she was found confused and obtunded on her sofa at home. Daughter states that patient may have vomited. She also notes jerking motions that have been concerning her for seizures. There is concern that pt may have taken opioids, although pt denies. Patient states that she likely hit the back of her head as she is having occipital HA and a "bump". + blurred vision. She also notes dry/ cracked lips (possibily of biting her tongue). Pt also states she has been having a cough for the past few days. Last time pt was seen within her normal states of health was on 6/2 around 5 pm, on 6/3 around 10AM she was found on the sofa and altered. The day prior to that pt was riding her bicycle (less than 1 mi distance). Pt denies any hx of seizures, recent f/v, abd pain, dysuria, paresthesias, neuro deficits, CP or SOB    At [facility] ED, patient was found to be hypotensive/ hypovolemic, in JACEY with Cr of 3.5, K+ of 7.1, bicarb of 18, CPK of 1240. WBC Of 21K. CT head was unremarkable. UA with evidence of UTI. She received IV rocephin and 4L NS with improvement in her encephalopathy. Her K improved from 7.1 --> 6.1 after shifting with insulin and albuterol. And her Cr improved from 3.5 --> 2.5 with IVF. On admission at [facility], pt was communicating appropriately, with no neuro deficits.     Hospital Course:  Patient was " admitted to hospital medicine 3. IVF were stopped and patient's mentation was normal by visit of the next morning. During the day patient began endorsing watery diarrheal events that were reported to be lightly bloody. Upon examination of the sample, appeared to look watery with pinkish tinge as light hematuria. Suspicious that patient may have voided urine during bowel events, although patient denied this occurring, as appearance could very well be urine secondary to rhabdomyolysis. H/H was checked and was found to be stable.  6/5 - A&Ox4. Blood cultures positive for gram positive rods.     Interval History: Developed diarrhea overnight. Denies any new pain. Patient still confused around inciting events and complains of having a sore tongue.     Review of Systems   Constitutional: Negative for activity change, appetite change, chills, diaphoresis, fatigue, fever and unexpected weight change.   Eyes: Positive for visual disturbance (blurry vision). Negative for photophobia, pain and redness.   Respiratory: Negative for cough and shortness of breath.    Cardiovascular: Negative for chest pain.   Gastrointestinal: Positive for diarrhea and nausea. Negative for abdominal pain and vomiting.   Endocrine: Negative for cold intolerance and heat intolerance.   Genitourinary: Negative for dysuria.   Musculoskeletal: Negative for arthralgias and myalgias.   Skin: Negative for rash and wound.   Neurological: Positive for headaches. Negative for dizziness, weakness and numbness.   Psychiatric/Behavioral: Negative for confusion.     Objective:     Vital Signs (Most Recent):  Temp: 99 °F (37.2 °C) (06/05/17 1124)  Pulse: 82 (06/05/17 1206)  Resp: 18 (06/05/17 1206)  BP: (!) 159/113 (06/05/17 1124)  SpO2: 96 % (06/05/17 1206) Vital Signs (24h Range):  Temp:  [98.7 °F (37.1 °C)-99 °F (37.2 °C)] 99 °F (37.2 °C)  Pulse:  [] 82  Resp:  [17-18] 18  SpO2:  [90 %-100 %] 96 %  BP: (157-169)/() 159/113        There is no height  "or weight on file to calculate BMI.    Intake/Output Summary (Last 24 hours) at 06/05/17 1548  Last data filed at 06/05/17 1300   Gross per 24 hour   Intake              720 ml   Output                0 ml   Net              720 ml      Physical Exam   Constitutional: She is oriented to person, place, and time. She appears well-developed and well-nourished. No distress.   HENT:   2-3 "knot", tender to palpation in occipital region    Eyes: Conjunctivae and EOM are normal. Pupils are equal, round, and reactive to light. No scleral icterus.   Neck: Normal range of motion. Neck supple.   Cardiovascular: Regular rhythm, normal heart sounds and intact distal pulses.    Pulmonary/Chest: Effort normal and breath sounds normal.   Abdominal: Soft. Bowel sounds are normal.   Musculoskeletal: Normal range of motion. She exhibits no edema or tenderness.   Neurological: She is alert and oriented to person, place, and time. She has normal reflexes.   Skin: She is not diaphoretic.   Psychiatric: She has a normal mood and affect. Her behavior is normal. Judgment and thought content normal.       Significant Labs:   CBC:   Recent Labs  Lab 06/04/17  0407 06/04/17  1753 06/05/17  0420   WBC 22.18* 21.05* 18.52*   HGB 12.8 13.1 12.6   HCT 38.7 39.1 37.7   * 138* 136*     CMP:   Recent Labs  Lab 06/03/17  2208 06/04/17  0407 06/05/17  0420    138 142   K 5.1 4.1 3.4*   * 111* 109   CO2 20* 19* 23   * 165* 111*   BUN 39* 28* 14   CREATININE 1.3 0.8 0.7   CALCIUM 8.2* 8.1* 8.1*   PROT 6.6 6.3 5.9*   ALBUMIN 3.2* 3.0* 2.7*   BILITOT 0.6 0.5 0.8   ALKPHOS 103 98 96   AST 57* 51* 41*   ALT 41 40 37   ANIONGAP 8 8 10   EGFRNONAA 46.0* >60.0 >60.0     Assessment/Plan:      Watery diarrhea    - Negative for C. Diff. Holding anti-motilities medications at this time.           Rhabdomyolysis    - CPK of 1240 --> 1740. Likely 2/2 immobility but seizure is not ruled out.   - See JACEY.         HTN (hypertension)    - As " atenolol and lisinopril are renally cleared, holding home BP medications.  - Placed on carvedilol 6.25 mg PO BID here.         Metabolic acidosis, normal anion gap (NAG)    - Likely mixed 2/2 ARF as well as vomiting  - Resolved.           JACEY (acute kidney injury)    - Likely pre-renal given volume depletion/ dehydration. May be 2/2 possible rhabdo component   - Cr 3.5 --> 2.5 --> 1.3 -> 0.8 resolved with IVF  - Stopped NS ggt at 150 cc/hr considering patient tolerating PO now and resolved JACEY.  - CK 1720 -->1406  - Daily BMP.         Acute encephalopathy    - Likely multifactorial, including septic encephalopathy, uremic encephalopathy, and polypharmacy with opiates (hydrocodone/tramadol). On admission -    - With UA suspicious for UTI with leukocytosis   - Uremia at 71 with JACEY.   - Presumptive positive for opiates (patient takes Norco at home).  - Meningitis was considered but patient's mentation has resolved to baseline and without nuchal rigidity.  - Suspicious for seizure as well, although patient has no known seizure history  - Now improving with IVF to treat JACEY/rhabdo and with sepsis therapy.  - Seizure precautions  - vEEG 24 hrs to r/u seizures   - Concerned about positive blood culture. Consulting ID for further recommendations.         Hyperkalemia    - 7.1 --> 6.1 --> 5.1 2/2 from rhabdomyolysis and JACEY.   - Resolved with IVF.          * Sepsis    - With leukocytosis (>20), JACEY, and altered mentation.  - Sepsis resolving, however WBC remains elevated despite antibiotics.   - Consulted ID for further recommendations regarding new blood culture results. Concerned for GI source or possible listeria infection.           VTE Risk Mitigation         Ordered     heparin (porcine) injection 5,000 Units  Every 8 hours     Route:  Subcutaneous        06/04/17 1740     Medium Risk of VTE  Once      06/03/17 2147     Place sequential compression device  Until discontinued      06/03/17 2147          Abdoulaye ORNELAS  DO Donald  Department of Hospital Medicine   [facility]

## 2017-06-05 NOTE — ASSESSMENT & PLAN NOTE
- concern for meningitis/encephalitis given recent possible seizure, leukocytosis, and gram + rods on blood culture (? Listeria) and tongue lesions (?HSV) or CMV (though much less likely as not immunocompromised.  - rec LP ASAP  - HSV 1/2 and CMV IgM  - repeat blood cultures   - procalcitonin  - will have blood cultures 6/3 with gram positive rods worked up and speciated - ? Real vs contaminant  - Will broaden abx coverage (Ampicillin, Ceftriaxone meningitis dosing) and Acyclovir (HSV)  - Stable non septic but with leukocytosis and no active signs of meningitis on exam  - discussed with primary team and ID staff  - will follow  - will de-escalate antimicrobials as needed

## 2017-06-06 PROBLEM — M62.82 RHABDOMYOLYSIS: Status: RESOLVED | Noted: 2017-06-03 | Resolved: 2017-06-06

## 2017-06-06 PROBLEM — E87.20 METABOLIC ACIDOSIS, NORMAL ANION GAP (NAG): Status: RESOLVED | Noted: 2017-06-03 | Resolved: 2017-06-06

## 2017-06-06 PROBLEM — E87.5 HYPERKALEMIA: Status: RESOLVED | Noted: 2017-06-03 | Resolved: 2017-06-06

## 2017-06-06 LAB
ALBUMIN SERPL BCP-MCNC: 2.7 G/DL
ALP SERPL-CCNC: 80 U/L
ALT SERPL W/O P-5'-P-CCNC: 44 U/L
ANION GAP SERPL CALC-SCNC: 7 MMOL/L
AST SERPL-CCNC: 42 U/L
BASOPHILS # BLD AUTO: 0.02 K/UL
BASOPHILS NFR BLD: 0.1 %
BILIRUB SERPL-MCNC: 0.6 MG/DL
BUN SERPL-MCNC: 13 MG/DL
CALCIUM SERPL-MCNC: 8.2 MG/DL
CHLORIDE SERPL-SCNC: 105 MMOL/L
CK SERPL-CCNC: 158 U/L
CO2 SERPL-SCNC: 28 MMOL/L
CREAT SERPL-MCNC: 0.7 MG/DL
DIFFERENTIAL METHOD: ABNORMAL
EOSINOPHIL # BLD AUTO: 0.1 K/UL
EOSINOPHIL NFR BLD: 0.4 %
ERYTHROCYTE [DISTWIDTH] IN BLOOD BY AUTOMATED COUNT: 12.8 %
EST. GFR  (AFRICAN AMERICAN): >60 ML/MIN/1.73 M^2
EST. GFR  (NON AFRICAN AMERICAN): >60 ML/MIN/1.73 M^2
GLUCOSE SERPL-MCNC: 102 MG/DL
HCT VFR BLD AUTO: 35.6 %
HGB BLD-MCNC: 11.9 G/DL
LYMPHOCYTES # BLD AUTO: 4.1 K/UL
LYMPHOCYTES NFR BLD: 28.8 %
MAGNESIUM SERPL-MCNC: 1.6 MG/DL
MCH RBC QN AUTO: 31.2 PG
MCHC RBC AUTO-ENTMCNC: 33.4 %
MCV RBC AUTO: 93 FL
MONOCYTES # BLD AUTO: 1.1 K/UL
MONOCYTES NFR BLD: 7.9 %
NEUTROPHILS # BLD AUTO: 9 K/UL
NEUTROPHILS NFR BLD: 62.5 %
PLATELET # BLD AUTO: 123 K/UL
PMV BLD AUTO: 9.9 FL
POTASSIUM SERPL-SCNC: 3.4 MMOL/L
PROCALCITONIN SERPL IA-MCNC: 0.17 NG/ML
PROT SERPL-MCNC: 5.5 G/DL
RBC # BLD AUTO: 3.81 M/UL
SODIUM SERPL-SCNC: 140 MMOL/L
WBC # BLD AUTO: 14.38 K/UL

## 2017-06-06 PROCEDURE — 36415 COLL VENOUS BLD VENIPUNCTURE: CPT

## 2017-06-06 PROCEDURE — 95951 HC EEG MONITORING/VIDEO RECORD: CPT

## 2017-06-06 PROCEDURE — 25000003 PHARM REV CODE 250: Performed by: STUDENT IN AN ORGANIZED HEALTH CARE EDUCATION/TRAINING PROGRAM

## 2017-06-06 PROCEDURE — 87086 URINE CULTURE/COLONY COUNT: CPT

## 2017-06-06 PROCEDURE — 82550 ASSAY OF CK (CPK): CPT

## 2017-06-06 PROCEDURE — 85025 COMPLETE CBC W/AUTO DIFF WBC: CPT

## 2017-06-06 PROCEDURE — 99233 SBSQ HOSP IP/OBS HIGH 50: CPT | Mod: ,,, | Performed by: INTERNAL MEDICINE

## 2017-06-06 PROCEDURE — 63600175 PHARM REV CODE 636 W HCPCS: Performed by: PHYSICIAN ASSISTANT

## 2017-06-06 PROCEDURE — 25000003 PHARM REV CODE 250: Performed by: PHYSICIAN ASSISTANT

## 2017-06-06 PROCEDURE — 95957 EEG DIGITAL ANALYSIS: CPT

## 2017-06-06 PROCEDURE — 25000003 PHARM REV CODE 250: Performed by: INTERNAL MEDICINE

## 2017-06-06 PROCEDURE — 63600175 PHARM REV CODE 636 W HCPCS: Performed by: STUDENT IN AN ORGANIZED HEALTH CARE EDUCATION/TRAINING PROGRAM

## 2017-06-06 PROCEDURE — 51798 US URINE CAPACITY MEASURE: CPT

## 2017-06-06 PROCEDURE — 80053 COMPREHEN METABOLIC PANEL: CPT

## 2017-06-06 PROCEDURE — 95951 PR EEG MONITORING/VIDEORECORD: CPT | Mod: 26,,, | Performed by: PSYCHIATRY & NEUROLOGY

## 2017-06-06 PROCEDURE — 83735 ASSAY OF MAGNESIUM: CPT

## 2017-06-06 PROCEDURE — 11000001 HC ACUTE MED/SURG PRIVATE ROOM

## 2017-06-06 PROCEDURE — 99233 SBSQ HOSP IP/OBS HIGH 50: CPT | Mod: ,,, | Performed by: HOSPITALIST

## 2017-06-06 RX ORDER — POTASSIUM CHLORIDE 20 MEQ/1
60 TABLET, EXTENDED RELEASE ORAL ONCE
Status: COMPLETED | OUTPATIENT
Start: 2017-06-06 | End: 2017-06-06

## 2017-06-06 RX ORDER — MAGNESIUM SULFATE HEPTAHYDRATE 40 MG/ML
2 INJECTION, SOLUTION INTRAVENOUS ONCE
Status: COMPLETED | OUTPATIENT
Start: 2017-06-06 | End: 2017-06-06

## 2017-06-06 RX ORDER — LISINOPRIL 10 MG/1
10 TABLET ORAL DAILY
Status: DISCONTINUED | OUTPATIENT
Start: 2017-06-07 | End: 2017-06-09 | Stop reason: HOSPADM

## 2017-06-06 RX ADMIN — AMPICILLIN SODIUM 2 G: 2 INJECTION, POWDER, FOR SOLUTION INTRAMUSCULAR; INTRAVENOUS at 05:06

## 2017-06-06 RX ADMIN — VANCOMYCIN HYDROCHLORIDE 1250 MG: 5 INJECTION, POWDER, LYOPHILIZED, FOR SOLUTION INTRAVENOUS at 09:06

## 2017-06-06 RX ADMIN — VANCOMYCIN HYDROCHLORIDE 1250 MG: 5 INJECTION, POWDER, LYOPHILIZED, FOR SOLUTION INTRAVENOUS at 03:06

## 2017-06-06 RX ADMIN — CARVEDILOL 6.25 MG: 6.25 TABLET, FILM COATED ORAL at 11:06

## 2017-06-06 RX ADMIN — CARVEDILOL 6.25 MG: 6.25 TABLET, FILM COATED ORAL at 08:06

## 2017-06-06 RX ADMIN — CEFTRIAXONE 2 G: 2 INJECTION, SOLUTION INTRAVENOUS at 05:06

## 2017-06-06 RX ADMIN — TRAMADOL HYDROCHLORIDE 50 MG: 50 TABLET, COATED ORAL at 06:06

## 2017-06-06 RX ADMIN — TRAMADOL HYDROCHLORIDE 50 MG: 50 TABLET, COATED ORAL at 09:06

## 2017-06-06 RX ADMIN — ACYCLOVIR SODIUM 460 MG: 50 INJECTION, SOLUTION INTRAVENOUS at 01:06

## 2017-06-06 RX ADMIN — AMPICILLIN SODIUM 2 G: 2 INJECTION, POWDER, FOR SOLUTION INTRAMUSCULAR; INTRAVENOUS at 03:06

## 2017-06-06 RX ADMIN — POTASSIUM CHLORIDE 60 MEQ: 1500 TABLET, EXTENDED RELEASE ORAL at 08:06

## 2017-06-06 RX ADMIN — TRAMADOL HYDROCHLORIDE 50 MG: 50 TABLET, COATED ORAL at 12:06

## 2017-06-06 RX ADMIN — HEPARIN SODIUM 5000 UNITS: 5000 INJECTION, SOLUTION INTRAVENOUS; SUBCUTANEOUS at 05:06

## 2017-06-06 RX ADMIN — AMPICILLIN SODIUM 2 G: 2 INJECTION, POWDER, FOR SOLUTION INTRAMUSCULAR; INTRAVENOUS at 11:06

## 2017-06-06 RX ADMIN — CEFTRIAXONE 2 G: 2 INJECTION, SOLUTION INTRAVENOUS at 07:06

## 2017-06-06 RX ADMIN — AMPICILLIN SODIUM 2 G: 2 INJECTION, POWDER, FOR SOLUTION INTRAMUSCULAR; INTRAVENOUS at 06:06

## 2017-06-06 RX ADMIN — MAGNESIUM SULFATE IN WATER 2 G: 40 INJECTION, SOLUTION INTRAVENOUS at 12:06

## 2017-06-06 RX ADMIN — ACYCLOVIR SODIUM 460 MG: 50 INJECTION, SOLUTION INTRAVENOUS at 05:06

## 2017-06-06 NOTE — ASSESSMENT & PLAN NOTE
- Likely pre-renal given volume depletion/ dehydration. May be 2/2 possible rhabdo component   - Cr 3.5 --> 2.5 --> 1.3 -> 0.8 resolved with IVF  - Stopped NS ggt at 150 cc/hr considering patient tolerating PO now and resolved JACEY.  - CK 1720 -->1406 -->158  - Resolved; daily BMP.

## 2017-06-06 NOTE — ASSESSMENT & PLAN NOTE
- concern for meningitis/encephalitis given recent possible seizure, leukocytosis, and gram + rods on blood culture (? Listeria) and tongue lesions (?HSV) or CMV (though much less likely as not immunocompromised.  - rec LP ASAP - please make sure opening pressure done- send CSF for cell count, culture and sensitivity, latex agglutination  - HSV 1/2 pcr and CMV IgM  - repeat blood cultures   - procalcitonin wnl  - will have blood cultures 6/3 with gram positive rods worked up and speciated - ? Real vs contaminant  - Continue abx coverage (Ampicillin, Ceftriaxone meningitis dosing) and Acyclovir (HSV)  - Stable non septic but with leukocytosis (improving) and no active signs of meningitis on exam  - discussed with primary team and ID staff  - will follow  - will de-escalate antimicrobials as needed  - incomplete bladder emptying -  bladder scan shows 59cc only

## 2017-06-06 NOTE — ASSESSMENT & PLAN NOTE
- Negative for C. Diff. Holding anti-motilities medications at this time.   - No significant abdominal pain or discomfort with abdominal US demonstrable for hepatomegaly, hepatic steatosis, and cholelithiasis.

## 2017-06-06 NOTE — ASSESSMENT & PLAN NOTE
- With leukocytosis (>20), JACEY, and altered mentation.  - Leukocytosis resolving with WBC now at 14.4  - Consulted ID for further recommendations regarding new blood culture results. Concerned for GI source or possible listeria infection. GPR likely contaminant- awaiting speciation.

## 2017-06-06 NOTE — SUBJECTIVE & OBJECTIVE
Interval History: No AEON.  AFebrile.  WBC improved to 14.38  Started on Vanc, Ampicillin, Acyclovir yesterday out of concern for Listeria or meningoencephalitis. Procalcitonin WNL. Global HA 50% improved.  Urinating only small amounts though denies dysuria.  The patient denies any recent fever, chills, or sweats.      Review of Systems   Constitutional: Negative for activity change, chills, diaphoresis and fever.   HENT: Positive for mouth sores.    Respiratory: Negative for cough, shortness of breath and wheezing.    Cardiovascular: Negative for chest pain.   Gastrointestinal: Negative for abdominal pain, constipation, diarrhea, nausea and vomiting.   Genitourinary: Negative for dysuria, frequency and urgency.   Musculoskeletal: Positive for back pain and neck pain.        Chronic neck and back pain     Neurological: Positive for headaches (improved). Negative for dizziness.   Hematological: Does not bruise/bleed easily.     Objective:     Vital Signs (Most Recent):  Temp: 98.6 °F (37 °C) (06/06/17 0355)  Pulse: 80 (06/06/17 0355)  Resp: 18 (06/06/17 0355)  BP: (!) 139/90 (06/06/17 0355)  SpO2: (!) 93 % (06/06/17 0355) Vital Signs (24h Range):  Temp:  [98.5 °F (36.9 °C)-99 °F (37.2 °C)] 98.6 °F (37 °C)  Pulse:  [] 80  Resp:  [18] 18  SpO2:  [90 %-100 %] 93 %  BP: (139-163)/() 139/90     Weight: 87.5 kg (192 lb 14.4 oz)  Body mass index is 37.67 kg/m².    Estimated Creatinine Clearance: 88.3 mL/min (based on Cr of 0.7).    Physical Exam   Constitutional: She is oriented to person, place, and time. She appears well-developed and well-nourished. No distress.   Appears more alert /cogent     HENT:   Head: Normocephalic and atraumatic.       Mouth/Throat: Uvula is midline, oropharynx is clear and moist and mucous membranes are normal. No oral lesions.       Eyes: EOM are normal. Pupils are equal, round, and reactive to light. No scleral icterus.   Cardiovascular: Normal rate, regular rhythm and normal heart  sounds.  Exam reveals no gallop and no friction rub.    No murmur heard.  Pulmonary/Chest: Effort normal and breath sounds normal. No respiratory distress. She has no wheezes. She has no rales.   Abdominal: Soft. Bowel sounds are normal. She exhibits no distension and no mass. There is no hepatosplenomegaly. There is no tenderness. There is no rebound and no guarding.   Musculoskeletal: She exhibits no edema.   Neurological: She is alert and oriented to person, place, and time.   Negative Kernig/Brudzinski   Skin: Skin is warm, dry and intact. No rash noted.   Psychiatric: She has a normal mood and affect. Her behavior is normal.   Slightly confused         Significant Labs:   Blood Culture:   Recent Labs  Lab 06/03/17  1219 06/05/17  1148   LABBLOO No Growth to date  No Growth to date  No Growth to date  Gram stain veronika bottle: Gram positive rods   Results called to and read back by:Arely Jorgensen RN 06/05/2017  04:16 No Growth to date  No Growth to date     CBC:   Recent Labs  Lab 06/04/17  1753 06/05/17  0420   WBC 21.05* 18.52*   HGB 13.1 12.6   HCT 39.1 37.7   * 136*     CMP:   Recent Labs  Lab 06/05/17  0420      K 3.4*      CO2 23   *   BUN 14   CREATININE 0.7   CALCIUM 8.1*   PROT 5.9*   ALBUMIN 2.7*   BILITOT 0.8   ALKPHOS 96   AST 41*   ALT 37   ANIONGAP 10   EGFRNONAA >60.0     Procalcitonin:   Recent Labs  Lab 06/04/17  0918 06/05/17  1907   PROCAL 0.12 0.17     All pertinent labs within the past 24 hours have been reviewed.    Significant Imaging: I have reviewed all pertinent imaging results/findings within the past 24 hours.   X-Ray Chest PA And Lateral [[id number]] Resulted: 06/04/17 1231   Order Status: Completed Updated: 06/04/17 1232   Narrative:     2 views: Heart size is normal.  Lungs are clear.  Bones reveal DJD.   Impression:      No acute process seen.      Electronically signed by: SERENA WHALEY  Date: 06/04/17  Time: 12:31    US Abdomen Limited [[id number]]  Resulted: 06/04/17 0904   Order Status: Completed Updated: 06/04/17 0904   Narrative:     Time of Procedure: 06/04/17 07:08:00  Accession # [id number]    Reason for study: Rule out cholecystitis    Comparison: None.    Technique: Limited right upper quadrant ultrasound was performed.    Findings: The liver is enlarged measuring 19.2 cm extending below the costal margin demonstrating findings most suggestive of hepatic steatosis.  No focal hepatic parenchymal abnormality. No intra- or extrahepatic biliary ductal dilatation. The common bile duct measures 0.4 cm.  The gallbladder demonstrates a solitary mobile stone measuring 0.5 cm. No other secondary evidence to suggest acute cholecystitis.  Sonographic Thurston's sign is negative. The visualized portions of the pancreas appear normal. The spleen is normal and measures 8.7 x 3.5 cm. No ascites.   Impression:         Cholelithiasis.    Hepatomegaly and hepatic steatosis.    ______________________________________     Electronically signed by resident: IRINA HERNANDEZ MD  Date: 06/04/17  Time: 08:18            As the supervising and teaching physician, I personally reviewed the images and resident's interpretation and I agree with the findings.          Electronically signed by: Ivett Sousa  Date: 06/04/17  Time: 09:04

## 2017-06-06 NOTE — PROGRESS NOTES
"[facility] Hospital Medicine  Progress Note    Patient Name: Lien Sotelo  MRN: [id number]  Patient Class: IP- Inpatient   Admission Date: 6/3/2017  Length of Stay: 3 days  Attending Physician: Karuna Kauffman MD  Primary [facility] Provider: [facility] Medicine Team: [facility] HOSP MED 3 Anil Duke MD    Subjective:     Principal Problem:Sepsis    HPI:  55 y/o F with hx of HTN (on lisinopril and atenolol) ,smoker, and with chronic back pain (s/p 3 surgeries, on opioids for pain) presents to  [facility] as a transfer from [facility] ED. Patient was brought to [facility] ED after she was found confused and obtunded on her sofa at home. Daughter states that patient may have vomited. She also notes jerking motions that have been concerning her for seizures. There is concern that pt may have taken opioids, although pt denies. Patient states that she likely hit the back of her head as she is having occipital HA and a "bump". + blurred vision. She also notes dry/ cracked lips (possibily of biting her tongue). Pt also states she has been having a cough for the past few days. Last time pt was seen within her normal states of health was on 6/2 around 5 pm, on 6/3 around 10AM she was found on the sofa and altered. The day prior to that pt was riding her bicycle (less than 1 mi distance). Pt denies any hx of seizures, recent f/v, abd pain, dysuria, paresthesias, neuro deficits, CP or SOB    At [facility] ED, patient was found to be hypotensive/ hypovolemic, in JACEY with Cr of 3.5, K+ of 7.1, bicarb of 18, CPK of 1240. WBC Of 21K. CT head was unremarkable. UA with evidence of UTI. She received IV rocephin and 4L NS with improvement in her encephalopathy. Her K improved from 7.1 --> 6.1 after shifting with insulin and albuterol. And her Cr improved from 3.5 --> 2.5 with IVF. On admission at [facility], pt was communicating appropriately, with no neuro deficits.     Hospital Course:  Patient was admitted to " [facility] medicine 3. IVF were stopped and patient's mentation was normal by visit of the next morning. During the day patient began endorsing watery diarrheal events that were reported to be lightly bloody. Upon examination of the sample, appeared to look watery with pinkish tinge as light hematuria. Suspicious that patient may have voided urine during bowel events, although patient denied this occurring, as appearance could very well be urine secondary to rhabdomyolysis. H/H was checked and was found to be stable. Blood cultures from 6/4 were positive for gram positive rods.      Interval History:   No acute events overnight. Patient continues to have mild occipital headache from site of her fall but denies photophobia this morning. Denies fevers, chills, nausea, or vomiting. Does have bilateral flank tenderness (new on this admission L>R). Denies dysuria. States it hurts to eat due to sores on tongue after she bit them sometime when she was unconscious prior to admission.     Review of Systems   Constitutional: Negative for activity change, appetite change, chills, diaphoresis, fatigue, fever and unexpected weight change.   Eyes: Negative for photophobia, pain, redness and visual disturbance.   Respiratory: Negative for cough and shortness of breath.    Cardiovascular: Negative for chest pain.   Gastrointestinal: Positive for diarrhea and nausea. Negative for abdominal pain and vomiting.   Endocrine: Negative for cold intolerance and heat intolerance.   Genitourinary: Negative for dysuria.   Musculoskeletal: Positive for back pain. Negative for arthralgias and myalgias.   Skin: Negative for rash and wound.   Neurological: Positive for headaches. Negative for dizziness, weakness and numbness.   Psychiatric/Behavioral: Negative for confusion.     Objective:     Vital Signs (Most Recent):  Temp: 98.5 °F (36.9 °C) (06/06/17 1605)  Pulse: 87 (06/06/17 1605)  Resp: 18 (06/06/17 1605)  BP: (!) 138/90 (06/06/17  "1605)  SpO2: 97 % (06/06/17 1605) Vital Signs (24h Range):  Temp:  [98.5 °F (36.9 °C)-98.8 °F (37.1 °C)] 98.5 °F (36.9 °C)  Pulse:  [] 87  Resp:  [18] 18  SpO2:  [91 %-100 %] 97 %  BP: (123-162)/() 138/90     Weight: 87.5 kg (192 lb 14.4 oz)  Body mass index is 37.67 kg/m².    Intake/Output Summary (Last 24 hours) at 06/06/17 1817  Last data filed at 06/06/17 0700   Gross per 24 hour   Intake              225 ml   Output                0 ml   Net              225 ml      Physical Exam   Constitutional: She is oriented to person, place, and time. She appears well-developed and well-nourished. No distress.   HENT:   2-3 "knot", tender to palpation in occipital region    Eyes: Conjunctivae and EOM are normal. Pupils are equal, round, and reactive to light. No scleral icterus.   Neck: Normal range of motion. Neck supple.   Cardiovascular: Regular rhythm, normal heart sounds and intact distal pulses.    Pulmonary/Chest: Effort normal and breath sounds normal.   Abdominal: Soft. Bowel sounds are normal.   Musculoskeletal: Normal range of motion. She exhibits no edema or tenderness.   Neurological: She is alert and oriented to person, place, and time. She has normal reflexes.   Skin: She is not diaphoretic.   Psychiatric: She has a normal mood and affect. Her behavior is normal. Judgment and thought content normal.       Significant Labs:   Blood Culture:   Recent Labs  Lab 06/05/17  1148   LABBLOO No Growth to date  No Growth to date  No Growth to date  No Growth to date     BMP:   Recent Labs  Lab 06/06/17  0506 06/06/17  0712     --      --    K 3.4*  --      --    CO2 28  --    BUN 13  --    CREATININE 0.7  --    CALCIUM 8.2*  --    MG  --  1.6     CBC:   Recent Labs  Lab 06/05/17  0420 06/06/17  0506   WBC 18.52* 14.38*   HGB 12.6 11.9*   HCT 37.7 35.6*   * 123*       Significant Imaging:   Imaging Results          X-Ray Chest PA And Lateral (Final result)  Result time 06/04/17 " 12:31:39    Final result by Antonino Murry III, MD (06/04/17 12:31:39)                 Impression:     No acute process seen.      Electronically signed by: ANTONINO MURRY  Date:     06/04/17  Time:    12:31              Narrative:    2 views: Heart size is normal.  Lungs are clear.  Bones reveal DJD.                             US Abdomen Limited (Final result)  Result time 06/04/17 09:04:33    Final result by Ivett Sousa MD (06/04/17 09:04:33)                 Impression:        Cholelithiasis.    Hepatomegaly and hepatic steatosis.    ______________________________________     Electronically signed by resident: IRINA HERNANDEZ MD  Date:     06/04/17  Time:    08:18            As the supervising and teaching physician, I personally reviewed the images and resident's interpretation and I agree with the findings.          Electronically signed by: Ivett Sousa  Date:     06/04/17  Time:    09:04              Narrative:    Time of Procedure: 06/04/17 07:08:00  Accession # [id number]    Reason for study: Rule out cholecystitis    Comparison: None.    Technique: Limited right upper quadrant ultrasound was performed.    Findings: The liver is enlarged measuring 19.2 cm extending below the costal margin demonstrating findings most suggestive of hepatic steatosis.  No focal hepatic parenchymal abnormality. No intra- or extrahepatic biliary ductal dilatation. The common bile duct measures 0.4 cm.  The gallbladder demonstrates a solitary mobile stone measuring 0.5 cm. No other secondary evidence to suggest acute cholecystitis.  Sonographic Thurston's sign is negative. The visualized portions of the pancreas appear normal. The spleen is normal and measures 8.7 x 3.5 cm. No ascites.                                Assessment/Plan:      * Sepsis    - With leukocytosis (>20), JACEY, and altered mentation.  - Leukocytosis resolving with WBC now at 14.4  - Consulted ID for further recommendations regarding new blood culture results.  Concerned for GI source or possible listeria infection. GPR likely contaminant- awaiting speciation.         Acute encephalopathy    - Likely multifactorial, including septic encephalopathy, uremic encephalopathy, and polypharmacy with opiates (hydrocodone/tramadol). On admission -    - With UA suspicious for UTI with leukocytosis   - Uremia at 71 with JACEY.   - Presumptive positive for opiates (patient takes Norco at home).  - Meningitis was considered but patient's mentation has resolved to baseline and without nuchal rigidity.  - Suspicious for seizure as well, although patient has no known seizure history  - Now resolved with IVF to treat JACEY/rhabdo and with sepsis therapy.  - Seizure precautions  - vEEG 24 hrs to r/u seizures   - Concerned about positive blood culture (gram positive rods). Consulting ID for further recommendations.   - Concern that patient with meningitis with mild nuchal rigidity, photophobia (now resolve) that put patient into seizure; suspicious that symptoms resolved with IV antiviral/antibiotics that were also treating meningitis. 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  - IR consult for fluoroscopy LP; patient has poor landmarks and previous L3-L4 spinal surgery and hypersensitivity; adamantly refuses and refuses to consent for bedside LP by us, neurology, or anesthesiology        JACEY (acute kidney injury)    - Likely pre-renal given volume depletion/ dehydration. May be 2/2 possible rhabdo component   - Cr 3.5 --> 2.5 --> 1.3 -> 0.8 resolved with IVF  - Stopped NS ggt at 150 cc/hr considering patient tolerating PO now and resolved JACEY.  - CK 1720 -->1406 -->158  - Resolved; daily BMP.         Rhabdomyolysis    - Resolved.  - CPK of 1240 --> 1740 --> 158. Likely 2/2 immobility but seizure is not ruled out.   - See JACEY.         Watery diarrhea    - Negative for C. Diff. Holding  anti-motilities medications at this time.   - No significant abdominal pain or discomfort with abdominal US demonstrable for hepatomegaly, hepatic steatosis, and cholelithiasis.          HTN (hypertension)    - Placed on carvedilol 6.25 mg PO BID here.   - Continue home lisinopril 10 mg PO daily now that JACEY has resolved.           VTE Risk Mitigation         Ordered     Medium Risk of VTE  Once      06/03/17 2147     Place sequential compression device  Until discontinued      06/03/17 2147        Disposition: Gram positive rods on one culture 6/3, awaiting speciation. Considering patient's altered mentation on presentation, with possible seizure and consequent rhabdomyolysis thereafter, meningitis in differential as source of infection for sepsis and trigger for unwitnessed seizure. JACEY has resolved (s/p rhabdo with IVF therapy). Patient currently getting EEG and with consult to IR for fluoroscopy-guided LP in AM (patient with past L3-L4 surgery and hypersensitivity upon palpation; patient does not consent to bedside LP).     Leukocytosis resolving. Procalcitonin 2X negative. Most recent UA not suspicious for UTI. Treated for presumptive meningitis with rocephin,vancomycin, ampicillin (per Listeria coverage considering GPR in blood culture), and acyclovir for HSV. Patient remains hemodynamically stable otherwise.     Anil Duke MD  PGY-1 Internal Medicine  [phone]    Department of Hospital Medicine   [facility]

## 2017-06-06 NOTE — ASSESSMENT & PLAN NOTE
- Likely multifactorial, including septic encephalopathy, uremic encephalopathy, and polypharmacy with opiates (hydrocodone/tramadol). On admission -    - With UA suspicious for UTI with leukocytosis   - Uremia at 71 with JACEY.   - Presumptive positive for opiates (patient takes Norco at home).  - Meningitis was considered but patient's mentation has resolved to baseline and without nuchal rigidity.  - Suspicious for seizure as well, although patient has no known seizure history  - Now resolved with IVF to treat JACEY/rhabdo and with sepsis therapy.  - Seizure precautions  - vEEG 24 hrs to r/u seizures   - Concerned about positive blood culture (gram positive rods). Consulting ID for further recommendations.   - Concern that patient with meningitis with mild nuchal rigidity, photophobia (now resolve) that put patient into seizure; suspicious that symptoms resolved with IV antiviral/antibiotics that were also treating meningitis. 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  - IR consult for fluoroscopy LP; patient has poor landmarks and previous L3-L4 spinal surgery and hypersensitivity; adamantly refuses and refuses to consent for bedside LP by us, neurology, or anesthesiology

## 2017-06-06 NOTE — ASSESSMENT & PLAN NOTE
- Resolved.  - CPK of 1240 --> 1740 --> 158. Likely 2/2 immobility but seizure is not ruled out.   - See JACEY.

## 2017-06-06 NOTE — NURSING
Patient rounds made more than every hour.  Vital signs stable. SBP remained < 180.  Afebrile.  At 2200 a second IV started so patient can get all of her IV antibiotics.  Complained of headache and back pain several times and Tramadol given po.  At 0600 patient stated headache relieved.  Patient stated she has been having diarrhea and unable to provide a urine specimen.  Cooperative throughtout the night.

## 2017-06-06 NOTE — ASSESSMENT & PLAN NOTE
- Placed on carvedilol 6.25 mg PO BID here.   - Continue home lisinopril 10 mg PO daily now that JACEY has resolved.

## 2017-06-06 NOTE — PROGRESS NOTES
[facility]  Infectious Disease  Progress Note    Patient Name: Lien Sotelo  MRN: [id number]  Admission Date: 6/3/2017  Length of Stay: 3 days  Attending Physician: Karuna Kauffman MD  Primary Care Provider: Primary Doctor No    Isolation Status: No active isolations  Assessment/Plan:      * Sepsis    - concern for meningitis/encephalitis given recent possible seizure, leukocytosis, and gram + rods on blood culture (? Listeria) and tongue lesions (?HSV) or CMV (though much less likely as not immunocompromised.  - rec LP ASAP - please make sure opening pressure done- send CSF for cell count, culture and sensitivity, latex agglutination  - HSV 1/2 pcr and CMV IgM  - repeat blood cultures   - procalcitonin wnl  - will have blood cultures 6/3 with gram positive rods worked up and speciated - ? Real vs contaminant  - Continue abx coverage (Ampicillin, Ceftriaxone meningitis dosing) and Acyclovir (HSV)  - Stable non septic but with leukocytosis (improving) and no active signs of meningitis on exam  - discussed with primary team and ID staff  - will follow  - will de-escalate antimicrobials as needed  - incomplete bladder emptying -  bladder scan shows 59cc only            Anticipated Disposition: TBD    Thank you for your consult. I will follow-up with patient. Please contact us if you have any additional questions.    CARI Chamorro  Infectious Disease  [facility]    Subjective:     Principal Problem:Sepsis    HPI: 55 y/o F with hx of HTN (on lisinopril and atenolol ) , smoker, and with chronic back pain (s/p 3 surgeries, on opioids for pain) presents to  [facility] as a transfer from [facility] ED. Patient was brought to [facility] ED after she was found confused and obtunded. Daughter states that patient had vomitus on her. She also notes jerking motions that have been concerning her for seizures. There is concern that pt may have taken opioids, although pt denied. Patient  "states that she likely hit the back of her head as she is having occipital HA and a "bump". She also notes dry/ cracked lips (possibily of biting her tongue). Pt also states she has been having a cough for the past 3 weeks and has been having diarrhea. She interacts with a dog and days she has received multiple scratches on her arms.  She reports dog has had all its shots.  Last time pt was seen within her normal states of health was on 6/2 around 5 pm, on 6/3 around 10AM she was found on the sofa and altered. The day prior to that pt was riding her bicycle (less than 1 mi distance). Pt denies any hx of seizures, recent f/v, abd pain, dysuria, paresthesias, neuro deficits, CP or SOB, sick contacts, or different eating habits.    At [facility] ED, patient was found to be hypotensive/ hypovolemic, in JACEY with Cr of 3.5, K+ of 7.1, bicarb of 18, CPK of 1240. WBC Of 21K. CT head was unremarkable. UA with evidence of UTI (WBC 40, moderate bacteria but no growth on cultures). She received IV rocephin and 4L NS with improvement in her encephalopathy. Her K improved from 7.1 --> 6.1 after shifting with insulin and albuterol. And her Cr improved from 3.5 --> 2.5 with IVF. On admission at [facility], pt was communicating appropriately, with no neuro deficits.     Currently she complains of global HA, photophobia, feels cold.  She has nausea and diarrhea.  She has neck and back pain that is at he baseline.    Interval History: No AEON.  AFebrile.  WBC improved to 14.38  Started on Vanc, Ampicillin, Acyclovir yesterday out of concern for Listeria or meningoencephalitis. Procalcitonin WNL. Global HA 50% improved.  Urinating only small amounts though denies dysuria.  The patient denies any recent fever, chills, or sweats.      Review of Systems   Constitutional: Negative for activity change, chills, diaphoresis and fever.   HENT: Positive for mouth sores.    Respiratory: Negative for cough, shortness of breath and wheezing.  "   Cardiovascular: Negative for chest pain.   Gastrointestinal: Negative for abdominal pain, constipation, diarrhea, nausea and vomiting.   Genitourinary: Negative for dysuria, frequency and urgency.   Musculoskeletal: Positive for back pain and neck pain.        Chronic neck and back pain     Neurological: Positive for headaches (improved). Negative for dizziness.   Hematological: Does not bruise/bleed easily.     Objective:     Vital Signs (Most Recent):  Temp: 98.6 °F (37 °C) (06/06/17 0355)  Pulse: 80 (06/06/17 0355)  Resp: 18 (06/06/17 0355)  BP: (!) 139/90 (06/06/17 0355)  SpO2: (!) 93 % (06/06/17 0355) Vital Signs (24h Range):  Temp:  [98.5 °F (36.9 °C)-99 °F (37.2 °C)] 98.6 °F (37 °C)  Pulse:  [] 80  Resp:  [18] 18  SpO2:  [90 %-100 %] 93 %  BP: (139-163)/() 139/90     Weight: 87.5 kg (192 lb 14.4 oz)  Body mass index is 37.67 kg/m².    Estimated Creatinine Clearance: 88.3 mL/min (based on Cr of 0.7).    Physical Exam   Constitutional: She is oriented to person, place, and time. She appears well-developed and well-nourished. No distress.   Appears more alert /cogent     HENT:   Head: Normocephalic and atraumatic.       Mouth/Throat: Uvula is midline, oropharynx is clear and moist and mucous membranes are normal. No oral lesions.       Eyes: EOM are normal. Pupils are equal, round, and reactive to light. No scleral icterus.   Cardiovascular: Normal rate, regular rhythm and normal heart sounds.  Exam reveals no gallop and no friction rub.    No murmur heard.  Pulmonary/Chest: Effort normal and breath sounds normal. No respiratory distress. She has no wheezes. She has no rales.   Abdominal: Soft. Bowel sounds are normal. She exhibits no distension and no mass. There is no hepatosplenomegaly. There is no tenderness. There is no rebound and no guarding.   Musculoskeletal: She exhibits no edema.   Neurological: She is alert and oriented to person, place, and time.   Negative Kernig/Brudzinski   Skin:  Skin is warm, dry and intact. No rash noted.   Psychiatric: She has a normal mood and affect. Her behavior is normal.   Slightly confused         Significant Labs:   Blood Culture:   Recent Labs  Lab 06/03/17  1219 06/05/17  1148   LABBLOO No Growth to date  No Growth to date  No Growth to date  Gram stain veronika bottle: Gram positive rods   Results called to and read back by:Arely Jorgensen RN 06/05/2017  04:16 No Growth to date  No Growth to date     CBC:   Recent Labs  Lab 06/04/17  1753 06/05/17  0420   WBC 21.05* 18.52*   HGB 13.1 12.6   HCT 39.1 37.7   * 136*     CMP:   Recent Labs  Lab 06/05/17  0420      K 3.4*      CO2 23   *   BUN 14   CREATININE 0.7   CALCIUM 8.1*   PROT 5.9*   ALBUMIN 2.7*   BILITOT 0.8   ALKPHOS 96   AST 41*   ALT 37   ANIONGAP 10   EGFRNONAA >60.0     Procalcitonin:   Recent Labs  Lab 06/04/17  0918 06/05/17  1907   PROCAL 0.12 0.17     All pertinent labs within the past 24 hours have been reviewed.    Significant Imaging: I have reviewed all pertinent imaging results/findings within the past 24 hours.   X-Ray Chest PA And Lateral [[id number]] Resulted: 06/04/17 1231   Order Status: Completed Updated: 06/04/17 1232   Narrative:     2 views: Heart size is normal.  Lungs are clear.  Bones reveal DJD.   Impression:      No acute process seen.      Electronically signed by: SERENA WHALEY  Date: 06/04/17  Time: 12:31    US Abdomen Limited [[id number]] Resulted: 06/04/17 0904   Order Status: Completed Updated: 06/04/17 0904   Narrative:     Time of Procedure: 06/04/17 07:08:00  Accession # [id number]    Reason for study: Rule out cholecystitis    Comparison: None.    Technique: Limited right upper quadrant ultrasound was performed.    Findings: The liver is enlarged measuring 19.2 cm extending below the costal margin demonstrating findings most suggestive of hepatic steatosis.  No focal hepatic parenchymal abnormality. No intra- or extrahepatic biliary ductal  dilatation. The common bile duct measures 0.4 cm.  The gallbladder demonstrates a solitary mobile stone measuring 0.5 cm. No other secondary evidence to suggest acute cholecystitis.  Sonographic Thurston's sign is negative. The visualized portions of the pancreas appear normal. The spleen is normal and measures 8.7 x 3.5 cm. No ascites.   Impression:         Cholelithiasis.    Hepatomegaly and hepatic steatosis.    ______________________________________     Electronically signed by resident: IRINA HERNANDEZ MD  Date: 06/04/17  Time: 08:18            As the supervising and teaching physician, I personally reviewed the images and resident's interpretation and I agree with the findings.          Electronically signed by: Ivett Sousa  Date: 06/04/17  Time: 09:04

## 2017-06-06 NOTE — SUBJECTIVE & OBJECTIVE
Interval History:   No acute events overnight. Patient continues to have mild occipital headache from site of her fall but denies photophobia this morning. Denies fevers, chills, nausea, or vomiting. Does have bilateral flank tenderness (new on this admission L>R). Denies dysuria. States it hurts to eat due to sores on tongue after she bit them sometime when she was unconscious prior to admission.     Review of Systems   Constitutional: Negative for activity change, appetite change, chills, diaphoresis, fatigue, fever and unexpected weight change.   Eyes: Negative for photophobia, pain, redness and visual disturbance.   Respiratory: Negative for cough and shortness of breath.    Cardiovascular: Negative for chest pain.   Gastrointestinal: Positive for diarrhea and nausea. Negative for abdominal pain and vomiting.   Endocrine: Negative for cold intolerance and heat intolerance.   Genitourinary: Negative for dysuria.   Musculoskeletal: Positive for back pain. Negative for arthralgias and myalgias.   Skin: Negative for rash and wound.   Neurological: Positive for headaches. Negative for dizziness, weakness and numbness.   Psychiatric/Behavioral: Negative for confusion.     Objective:     Vital Signs (Most Recent):  Temp: 98.5 °F (36.9 °C) (06/06/17 1605)  Pulse: 87 (06/06/17 1605)  Resp: 18 (06/06/17 1605)  BP: (!) 138/90 (06/06/17 1605)  SpO2: 97 % (06/06/17 1605) Vital Signs (24h Range):  Temp:  [98.5 °F (36.9 °C)-98.8 °F (37.1 °C)] 98.5 °F (36.9 °C)  Pulse:  [] 87  Resp:  [18] 18  SpO2:  [91 %-100 %] 97 %  BP: (123-162)/() 138/90     Weight: 87.5 kg (192 lb 14.4 oz)  Body mass index is 37.67 kg/m².    Intake/Output Summary (Last 24 hours) at 06/06/17 1817  Last data filed at 06/06/17 0700   Gross per 24 hour   Intake              225 ml   Output                0 ml   Net              225 ml      Physical Exam   Constitutional: She is oriented to person, place, and time. She appears well-developed and  "well-nourished. No distress.   HENT:   2-3 "knot", tender to palpation in occipital region    Eyes: Conjunctivae and EOM are normal. Pupils are equal, round, and reactive to light. No scleral icterus.   Neck: Normal range of motion. Neck supple.   Cardiovascular: Regular rhythm, normal heart sounds and intact distal pulses.    Pulmonary/Chest: Effort normal and breath sounds normal.   Abdominal: Soft. Bowel sounds are normal.   Musculoskeletal: Normal range of motion. She exhibits no edema or tenderness.   Neurological: She is alert and oriented to person, place, and time. She has normal reflexes.   Skin: She is not diaphoretic.   Psychiatric: She has a normal mood and affect. Her behavior is normal. Judgment and thought content normal.       Significant Labs:   Blood Culture:   Recent Labs  Lab 06/05/17  1148   LABBLOO No Growth to date  No Growth to date  No Growth to date  No Growth to date     BMP:   Recent Labs  Lab 06/06/17  0506 06/06/17  0712     --      --    K 3.4*  --      --    CO2 28  --    BUN 13  --    CREATININE 0.7  --    CALCIUM 8.2*  --    MG  --  1.6     CBC:   Recent Labs  Lab 06/05/17  0420 06/06/17  0506   WBC 18.52* 14.38*   HGB 12.6 11.9*   HCT 37.7 35.6*   * 123*       Significant Imaging:   Imaging Results          X-Ray Chest PA And Lateral (Final result)  Result time 06/04/17 12:31:39    Final result by Antonino Whaley III, MD (06/04/17 12:31:39)                 Impression:     No acute process seen.      Electronically signed by: ANTONINO WHALEY  Date:     06/04/17  Time:    12:31              Narrative:    2 views: Heart size is normal.  Lungs are clear.  Bones reveal DJD.                             US Abdomen Limited (Final result)  Result time 06/04/17 09:04:33    Final result by Ivett Sousa MD (06/04/17 09:04:33)                 Impression:        Cholelithiasis.    Hepatomegaly and hepatic steatosis.    ______________________________________ "     Electronically signed by resident: IRINA HERNANDEZ MD  Date:     06/04/17  Time:    08:18            As the supervising and teaching physician, I personally reviewed the images and resident's interpretation and I agree with the findings.          Electronically signed by: Ivett Sousa  Date:     06/04/17  Time:    09:04              Narrative:    Time of Procedure: 06/04/17 07:08:00  Accession # [id number]    Reason for study: Rule out cholecystitis    Comparison: None.    Technique: Limited right upper quadrant ultrasound was performed.    Findings: The liver is enlarged measuring 19.2 cm extending below the costal margin demonstrating findings most suggestive of hepatic steatosis.  No focal hepatic parenchymal abnormality. No intra- or extrahepatic biliary ductal dilatation. The common bile duct measures 0.4 cm.  The gallbladder demonstrates a solitary mobile stone measuring 0.5 cm. No other secondary evidence to suggest acute cholecystitis.  Sonographic Thurston's sign is negative. The visualized portions of the pancreas appear normal. The spleen is normal and measures 8.7 x 3.5 cm. No ascites.

## 2017-06-07 PROBLEM — N17.9 AKI (ACUTE KIDNEY INJURY): Status: RESOLVED | Noted: 2017-06-03 | Resolved: 2017-06-07

## 2017-06-07 PROBLEM — M62.82 RHABDOMYOLYSIS: Status: RESOLVED | Noted: 2017-06-03 | Resolved: 2017-06-07

## 2017-06-07 LAB
ALBUMIN SERPL BCP-MCNC: 2.9 G/DL
ALP SERPL-CCNC: 118 U/L
ALT SERPL W/O P-5'-P-CCNC: 99 U/L
ANION GAP SERPL CALC-SCNC: 9 MMOL/L
AST SERPL-CCNC: 100 U/L
BACTERIA BLD CULT: NORMAL
BACTERIA UR CULT: NO GROWTH
BASOPHILS # BLD AUTO: 0.02 K/UL
BASOPHILS NFR BLD: 0.2 %
BILIRUB SERPL-MCNC: 0.5 MG/DL
BUN SERPL-MCNC: 10 MG/DL
CALCIUM SERPL-MCNC: 8.4 MG/DL
CHLORIDE SERPL-SCNC: 104 MMOL/L
CLARITY CSF: ABNORMAL
CLARITY CSF: CLEAR
CMV IGM TITR SERPL: <8 U/ML
CO2 SERPL-SCNC: 26 MMOL/L
COLOR CSF: ABNORMAL
COLOR CSF: COLORLESS
CREAT SERPL-MCNC: 0.7 MG/DL
DIFFERENTIAL METHOD: ABNORMAL
EOSINOPHIL # BLD AUTO: 0.2 K/UL
EOSINOPHIL NFR BLD: 1.7 %
EOSINOPHIL NFR CSF MANUAL: 3 %
ERYTHROCYTE [DISTWIDTH] IN BLOOD BY AUTOMATED COUNT: 12.6 %
EST. GFR  (AFRICAN AMERICAN): >60 ML/MIN/1.73 M^2
EST. GFR  (NON AFRICAN AMERICAN): >60 ML/MIN/1.73 M^2
GLUCOSE CSF-MCNC: 65 MG/DL
GLUCOSE SERPL-MCNC: 117 MG/DL
HCT VFR BLD AUTO: 37.1 %
HGB BLD-MCNC: 12.4 G/DL
HSV-1 DNA BY PCR: NEGATIVE
HSV-2 DNA BY PCR: NEGATIVE
L PNEUMO AG UR QL IA: NOT DETECTED
LYMPHOCYTES # BLD AUTO: 3.1 K/UL
LYMPHOCYTES NFR BLD: 30.5 %
LYMPHOCYTES NFR CSF MANUAL: 14 %
LYMPHOCYTES NFR CSF MANUAL: 20 %
MAGNESIUM SERPL-MCNC: 1.9 MG/DL
MCH RBC QN AUTO: 31.1 PG
MCHC RBC AUTO-ENTMCNC: 33.4 %
MCV RBC AUTO: 93 FL
MONOCYTES # BLD AUTO: 0.8 K/UL
MONOCYTES NFR BLD: 7.8 %
MONOS+MACROS NFR CSF MANUAL: 18 %
MONOS+MACROS NFR CSF MANUAL: 7 %
NEUTROPHILS # BLD AUTO: 6 K/UL
NEUTROPHILS NFR BLD: 58.9 %
NEUTROPHILS NFR CSF MANUAL: 68 %
NEUTROPHILS NFR CSF MANUAL: 70 %
PLATELET # BLD AUTO: 138 K/UL
PMV BLD AUTO: 9.8 FL
POTASSIUM SERPL-SCNC: 3.6 MMOL/L
PROT CSF-MCNC: 26 MG/DL
PROT SERPL-MCNC: 5.8 G/DL
RBC # BLD AUTO: 3.99 M/UL
RBC # CSF: 205 /CU MM
RBC # CSF: ABNORMAL /CU MM
SODIUM SERPL-SCNC: 139 MMOL/L
SPECIMEN VOL CSF: 1 ML
SPECIMEN VOL CSF: 2 ML
VANCOMYCIN TROUGH SERPL-MCNC: 11.5 UG/ML
WBC # BLD AUTO: 10.18 K/UL
WBC # CSF: 1 /CU MM
WBC # CSF: 41 /CU MM

## 2017-06-07 PROCEDURE — 82945 GLUCOSE OTHER FLUID: CPT

## 2017-06-07 PROCEDURE — 25000003 PHARM REV CODE 250: Performed by: STUDENT IN AN ORGANIZED HEALTH CARE EDUCATION/TRAINING PROGRAM

## 2017-06-07 PROCEDURE — 36415 COLL VENOUS BLD VENIPUNCTURE: CPT

## 2017-06-07 PROCEDURE — 25000003 PHARM REV CODE 250: Performed by: HOSPITALIST

## 2017-06-07 PROCEDURE — 11000001 HC ACUTE MED/SURG PRIVATE ROOM

## 2017-06-07 PROCEDURE — 87205 SMEAR GRAM STAIN: CPT

## 2017-06-07 PROCEDURE — 99233 SBSQ HOSP IP/OBS HIGH 50: CPT | Mod: ,,, | Performed by: HOSPITALIST

## 2017-06-07 PROCEDURE — 80053 COMPREHEN METABOLIC PANEL: CPT

## 2017-06-07 PROCEDURE — 25000003 PHARM REV CODE 250: Performed by: PHYSICIAN ASSISTANT

## 2017-06-07 PROCEDURE — 83735 ASSAY OF MAGNESIUM: CPT

## 2017-06-07 PROCEDURE — 63600175 PHARM REV CODE 636 W HCPCS: Performed by: PHYSICIAN ASSISTANT

## 2017-06-07 PROCEDURE — 87070 CULTURE OTHR SPECIMN AEROBIC: CPT

## 2017-06-07 PROCEDURE — 84157 ASSAY OF PROTEIN OTHER: CPT

## 2017-06-07 PROCEDURE — 99000 SPECIMEN HANDLING OFFICE-LAB: CPT

## 2017-06-07 PROCEDURE — 99233 SBSQ HOSP IP/OBS HIGH 50: CPT | Mod: ,,, | Performed by: PHYSICIAN ASSISTANT

## 2017-06-07 PROCEDURE — 25000003 PHARM REV CODE 250: Performed by: INTERNAL MEDICINE

## 2017-06-07 PROCEDURE — 87529 HSV DNA AMP PROBE: CPT

## 2017-06-07 PROCEDURE — 95813 EEG EXTND MNTR 61-119 MIN: CPT | Mod: 26,,, | Performed by: PSYCHIATRY & NEUROLOGY

## 2017-06-07 PROCEDURE — 009U3ZX DRAINAGE OF SPINAL CANAL, PERCUTANEOUS APPROACH, DIAGNOSTIC: ICD-10-PCS | Performed by: HOSPITALIST

## 2017-06-07 PROCEDURE — 85025 COMPLETE CBC W/AUTO DIFF WBC: CPT

## 2017-06-07 PROCEDURE — 89051 BODY FLUID CELL COUNT: CPT | Mod: 91

## 2017-06-07 PROCEDURE — 63600175 PHARM REV CODE 636 W HCPCS: Performed by: STUDENT IN AN ORGANIZED HEALTH CARE EDUCATION/TRAINING PROGRAM

## 2017-06-07 PROCEDURE — 80202 ASSAY OF VANCOMYCIN: CPT

## 2017-06-07 RX ORDER — LORAZEPAM 2 MG/ML
0.5 INJECTION INTRAMUSCULAR ONCE
Status: COMPLETED | OUTPATIENT
Start: 2017-06-07 | End: 2017-06-07

## 2017-06-07 RX ORDER — ATORVASTATIN CALCIUM 20 MG/1
20 TABLET, FILM COATED ORAL DAILY
Status: DISCONTINUED | OUTPATIENT
Start: 2017-06-07 | End: 2017-06-09 | Stop reason: HOSPADM

## 2017-06-07 RX ORDER — DULOXETIN HYDROCHLORIDE 20 MG/1
20 CAPSULE, DELAYED RELEASE ORAL ONCE
Status: COMPLETED | OUTPATIENT
Start: 2017-06-07 | End: 2017-06-07

## 2017-06-07 RX ORDER — GABAPENTIN 100 MG/1
100 CAPSULE ORAL 3 TIMES DAILY
Status: DISCONTINUED | OUTPATIENT
Start: 2017-06-07 | End: 2017-06-07

## 2017-06-07 RX ORDER — DULOXETIN HYDROCHLORIDE 20 MG/1
40 CAPSULE, DELAYED RELEASE ORAL DAILY
Status: DISCONTINUED | OUTPATIENT
Start: 2017-06-07 | End: 2017-06-07

## 2017-06-07 RX ORDER — DULOXETIN HYDROCHLORIDE 60 MG/1
60 CAPSULE, DELAYED RELEASE ORAL DAILY
Status: DISCONTINUED | OUTPATIENT
Start: 2017-06-08 | End: 2017-06-09 | Stop reason: HOSPADM

## 2017-06-07 RX ORDER — LORAZEPAM 0.5 MG/1
0.5 TABLET ORAL EVERY 6 HOURS PRN
Status: DISCONTINUED | OUTPATIENT
Start: 2017-06-07 | End: 2017-06-09 | Stop reason: HOSPADM

## 2017-06-07 RX ORDER — CARVEDILOL 12.5 MG/1
12.5 TABLET ORAL 2 TIMES DAILY
Status: DISCONTINUED | OUTPATIENT
Start: 2017-06-07 | End: 2017-06-08

## 2017-06-07 RX ORDER — GABAPENTIN 300 MG/1
600 CAPSULE ORAL 3 TIMES DAILY
Status: DISCONTINUED | OUTPATIENT
Start: 2017-06-07 | End: 2017-06-09 | Stop reason: HOSPADM

## 2017-06-07 RX ADMIN — CEFTRIAXONE 2 G: 2 INJECTION, SOLUTION INTRAVENOUS at 06:06

## 2017-06-07 RX ADMIN — TRAMADOL HYDROCHLORIDE 50 MG: 50 TABLET, COATED ORAL at 05:06

## 2017-06-07 RX ADMIN — ACYCLOVIR SODIUM 460 MG: 50 INJECTION, SOLUTION INTRAVENOUS at 05:06

## 2017-06-07 RX ADMIN — VANCOMYCIN HYDROCHLORIDE 1250 MG: 5 INJECTION, POWDER, LYOPHILIZED, FOR SOLUTION INTRAVENOUS at 10:06

## 2017-06-07 RX ADMIN — DULOXETINE HYDROCHLORIDE 20 MG: 20 CAPSULE, DELAYED RELEASE ORAL at 11:06

## 2017-06-07 RX ADMIN — LORAZEPAM 0.5 MG: 2 INJECTION INTRAMUSCULAR; INTRAVENOUS at 10:06

## 2017-06-07 RX ADMIN — CARVEDILOL 6.25 MG: 6.25 TABLET, FILM COATED ORAL at 08:06

## 2017-06-07 RX ADMIN — AMPICILLIN SODIUM 4000 MG: 2 INJECTION, POWDER, FOR SOLUTION INTRAMUSCULAR; INTRAVENOUS at 11:06

## 2017-06-07 RX ADMIN — ACYCLOVIR SODIUM 460 MG: 50 INJECTION, SOLUTION INTRAVENOUS at 12:06

## 2017-06-07 RX ADMIN — CARVEDILOL 12.5 MG: 12.5 TABLET, FILM COATED ORAL at 08:06

## 2017-06-07 RX ADMIN — TRAMADOL HYDROCHLORIDE 50 MG: 50 TABLET, COATED ORAL at 11:06

## 2017-06-07 RX ADMIN — GABAPENTIN 100 MG: 100 CAPSULE ORAL at 10:06

## 2017-06-07 RX ADMIN — TRAZODONE HYDROCHLORIDE 150 MG: 100 TABLET ORAL at 08:06

## 2017-06-07 RX ADMIN — ACETAMINOPHEN 650 MG: 325 TABLET ORAL at 06:06

## 2017-06-07 RX ADMIN — VANCOMYCIN HYDROCHLORIDE 1500 MG: 100 INJECTION, POWDER, LYOPHILIZED, FOR SOLUTION INTRAVENOUS at 09:06

## 2017-06-07 RX ADMIN — AMPICILLIN SODIUM 2 G: 2 INJECTION, POWDER, FOR SOLUTION INTRAMUSCULAR; INTRAVENOUS at 07:06

## 2017-06-07 RX ADMIN — ACYCLOVIR SODIUM 460 MG: 50 INJECTION, SOLUTION INTRAVENOUS at 09:06

## 2017-06-07 RX ADMIN — GABAPENTIN 600 MG: 300 CAPSULE ORAL at 09:06

## 2017-06-07 RX ADMIN — AMPICILLIN SODIUM 2 G: 2 INJECTION, POWDER, FOR SOLUTION INTRAMUSCULAR; INTRAVENOUS at 03:06

## 2017-06-07 RX ADMIN — DULOXETINE HYDROCHLORIDE 40 MG: 20 CAPSULE, DELAYED RELEASE ORAL at 10:06

## 2017-06-07 RX ADMIN — LISINOPRIL 10 MG: 10 TABLET ORAL at 08:06

## 2017-06-07 RX ADMIN — ATORVASTATIN CALCIUM 20 MG: 20 TABLET, FILM COATED ORAL at 06:06

## 2017-06-07 RX ADMIN — TRAMADOL HYDROCHLORIDE 50 MG: 50 TABLET, COATED ORAL at 03:06

## 2017-06-07 RX ADMIN — HYDRALAZINE HYDROCHLORIDE 25 MG: 25 TABLET, FILM COATED ORAL at 05:06

## 2017-06-07 RX ADMIN — AMPICILLIN SODIUM 2 G: 2 INJECTION, POWDER, FOR SOLUTION INTRAMUSCULAR; INTRAVENOUS at 12:06

## 2017-06-07 NOTE — PROGRESS NOTES
Spoke with Robert in EEG r/t pt requesting EEG to be discontinued. Per pt was to be removed at 1215. Robert received verbal from Dr. Clemente with neurology okay to remove electrodes, and will be up shortly. Will notify pt.

## 2017-06-07 NOTE — PROCEDURES
DATE OF PROCEDURE:  06/06/2017    EEG NUMBER:  [id number]    LOCATION OF SERVICE:  945    REQUESTING PHYSICIAN:  Dr. Cardenas.    ELECTROENCEPHALOGRAM REPORT  Extended Recording    METHODOLOGY:  Electroencephalographic (EEG) is recorded with electrodes placed   according to the International 10-20 placement system.  Thirty two (32) channels   of digital signal (sampling rate of 512/sec), including T1 and T2, were   simultaneously recorded from the scalp and may include EKG, EMG, and/or eye   monitors.  Recording band pass was 0.1 to 512 Hz.  Digital video recording of   the patient is simultaneously recorded with the EEG.  The patient is instructed   to report clinical symptoms which may occur during the recording session.  EEG   and video recording are stored and archived in digital format.  Activation   procedures, which include photic stimulation, hyperventilation and instructing   patients to perform simple tasks, are done in selected patients.    The EEG is displayed on a monitor screen and can be reviewed using different   montages.  Computer-assisted analysis is employed to detect spike and   electrographic seizure activity.  The entire record is submitted for computer   analysis.  The entire recording is visually reviewed, and the times identified   by computer analysis as being spikes or seizures are reviewed again.    Compressed spectral analysis (CSA) is also performed on the activity recorded   from each individual channel.  This is displayed as a power display of   frequencies from 0 to 30 Hz over time.  The CSA is reviewed looking for   asymmetries in power between homologous areas of the scalp, then compared with   the original EEG recording.    Endo[employer]here software was also utilized in the review of this study.  This software   suite analyzes the EEG recording in multiple domains.  Coherence and rhythmicity   are computed to identify EEG sections which may contain organized seizures.    Each channel undergoes  analysis to detect the presence of spike and sharp waves   which have special and morphological characteristics of epileptic activity.  The   routine EEG recording is converted from special into frequency domain.  This is   then displayed comparing homologous areas to identify areas of significant   asymmetry.  Algorithm to identify non-cortically generated artifact is used to   separate artifact from the EEG.    RECORDING TIMES:  Start on 06/06/2017 at 14:17  Stop on 06/07/2017 at 07:00  A total of 17 hours and 39 minutes was recorded.    EEG FINDINGS:  The recording was obtained at the patient's bedside in her   hospital room.  In the waking state, a posterior dominant rhythm of 9 to 10 Hz   is noted in the occipital leads bilaterally with occasional spread in the   parietal posterior temporal area.  An irregular low voltage beta was noted   diffusely.  With sleep, the expected stages and cycles were noted.  On 2   occasions, a somewhat higher amplitude theta wave was noted, which fell from the   background and was somewhat sharply contoured.  However, a symmetrical rise and   fall due to the sharp component would suggest that this is not a pathological   finding.  There were no other lateralized or focal changes noted and no clear   spike or sharp wave activity was seen.  Activation procedures were not carried   out.  No clinical or behavioral events were reported during the monitoring.    IMPRESSION:  Normal EEG monitoring.    CLINICAL CORRELATION:  The patient is a 56-year-old female who has chronic low   back pain and has had opiates, who presented with altered mental status and   confusion.  This occurred after she had been found down for an unknown period of   time with evidence of vomit.  This tracing, however, does not show evidence for   cortical dysfunction nor an epileptic process.        /keesha [id number] blank(s)        RR/HN  dd: 06/07/2017 15:03:12 (CDT)  td: 06/07/2017 16:25:09 (CDT)  Doc ID   #[id number]   Job ID #[id number]    CC:

## 2017-06-07 NOTE — ASSESSMENT & PLAN NOTE
- Likely multifactorial, including septic encephalopathy, uremic encephalopathy, and polypharmacy with opiates (hydrocodone/tramadol). On admission -    - With UA suspicious for UTI with leukocytosis   - Uremia at 71 with JACEY.   - Presumptive positive for opiates (patient takes Norco at home).  - Meningitis was considered but patient's mentation has resolved to baseline and without nuchal rigidity.  - Suspicious for seizure as well, although patient has no known seizure history  - Now resolved with IVF to treat JACEY/rhabdo and with sepsis therapy.  - Seizure precautions  - vEEG 24 hrs to r/u seizures, completed today.   - ID consulted and concern that patient with meningitis with mild nuchal rigidity, photophobia (now resolve) that put patient into seizure; suspicious that symptoms resolved with IV antiviral/antibiotics that were also treating meningitis. 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  - IR consult for fluoroscopy LP   - Opening pressure moderately elevated at 25 mm Hg.    - Glucose and protein normal at 65 and 26.    - WBC 1 and RBC significant (second aliquot) at 205.   - HSV pending, in consideration   - Yield of CSF unclear as patient has been broadly treated s/p 3 days; pending ID recommendations.

## 2017-06-07 NOTE — H&P
Inpatient Radiology Pre-procedure Note    History of Present Illness:  Lien Sotelo is a 56 y.o. female who presents for x-ray guided lumbar puncture.     Admission H&P reviewed.  Past Medical History:   Diagnosis Date    Chronic back pain     HTN (hypertension)      Past Surgical History:   Procedure Laterality Date    BACK SURGERY      X 3       Review of Systems:   As documented in primary team H&P    Home Meds:   Prior to Admission medications    Medication Sig Start Date End Date Taking? Authorizing Provider   atenolol (TENORMIN) 25 MG tablet Take 25 mg by mouth once daily.   Yes Historical Provider, MD   hydrocodone-acetaminophen 10-325mg (NORCO)  mg Tab Take 1 tablet by mouth every 8 (eight) hours as needed for Pain.    Yes Historical Provider, MD   lisinopril 10 MG tablet Take 10 mg by mouth once daily.   Yes Historical Provider, MD     Scheduled Meds:    acyclovir  10 mg/kg (Ideal) Intravenous Q8H    ampicillin (OMNIPEN) 4 g in  mL EXTENDED INFUSION  4,000 mg Intravenous Q8H    atorvastatin  20 mg Oral Daily    carvedilol  12.5 mg Oral BID    cefTRIAXone (ROCEPHIN) IVPB  2 g Intravenous Q12H    [START ON 6/8/2017] duloxetine  60 mg Oral Daily    gabapentin  600 mg Oral TID    lisinopril  10 mg Oral Daily    trazodone  150 mg Oral QHS    vancomycin (VANCOCIN) IVPB  1,500 mg Intravenous Q12H     Continuous Infusions:    PRN Meds:acetaminophen, glucose, glucose, hydrALAZINE, lorazepam, ondansetron, senna-docusate 8.6-50 mg, tramadol  Anticoagulants/Antiplatelets: no anticoagulation    Allergies: Review of patient's allergies indicates:  No Known Allergies  Sedation Hx: have not been any systemic reactions    Labs:    Recent Labs  Lab 06/03/17  1126   INR 0.9       Recent Labs  Lab 06/07/17  0618   WBC 10.18   HGB 12.4   HCT 37.1   MCV 93   *      Recent Labs  Lab 06/07/17  0618   *      K 3.6      CO2 26   BUN 10   CREATININE 0.7   CALCIUM 8.4*   MG 1.9    ALT 99*   *   ALBUMIN 2.9*   BILITOT 0.5         Vitals:  Temp: 98.6 °F (37 °C) (06/07/17 1156)  Pulse: 87 (06/07/17 1430)  Resp: 18 (06/07/17 1156)  BP: (!) 147/97 (06/07/17 1156)  SpO2: 97 % (06/07/17 1156)     Physical Exam:  ASA: 2  Mallampati: 3    General: anxious  Mental Status: alert and oriented to person, place and time  HEENT: normocephalic, atraumatic  Chest: unlabored breathing  Abdomen: nondistended  Extremity: moves all extremities    Plan: X-ray guided lumbar puncture.  Sedation Plan: None    Alan Johns MD  Resident  Department of Radiology  Pager: [phone]

## 2017-06-07 NOTE — ASSESSMENT & PLAN NOTE
- With leukocytosis (>20), JACEY, and altered mentation.  - Leukocytosis resolving with WBC now resolving to 10.2.   - LP performed on 6/6; results unconvincing for meningitis - see acute encephalopathy   - With some left flank tenderness with UA suspicious for UTI on admission but repeat unremarkable -retroperitoneal ultrasound ordered to rule out pyelonephritis but unlikely.

## 2017-06-07 NOTE — ASSESSMENT & PLAN NOTE
- concern for meningitis/encephalitis given recent possible seizure, leukocytosis, and gram + rods on blood culture (? Listeria) and tongue lesions (?HSV) or CMV (though much less likely as not immunocompromised.  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 2 independent infections?  - repeat blood cultures NGTD  - procalcitonin wnl  - Continue abx coverage (Ampicillin, Ceftriaxone meningitis dosing) and Acyclovir (HSV)  - Stable non septic with resolved leukocytosis and no active signs of meningitis on exam  - will follow  - will de-escalate antimicrobials as needed

## 2017-06-07 NOTE — PROGRESS NOTES
[facility]  Infectious Disease  Progress Note    Patient Name: Lien Sotelo  MRN: [id number]  Admission Date: 6/3/2017  Length of Stay: 4 days  Attending Physician: Karuna Kauffman MD  Primary Care Provider: Primary Doctor No    Isolation Status: No active isolations  Assessment/Plan:      * Sepsis    - concern for meningitis/encephalitis given recent possible seizure, leukocytosis, and gram + rods on blood culture (? Listeria) and tongue lesions (?HSV) or CMV (though much less likely as not immunocompromised.  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 2 independent infections?  - repeat blood cultures NGTD  - procalcitonin wnl  - Continue abx coverage (Ampicillin, Ceftriaxone meningitis dosing) and Acyclovir (HSV)  - Stable non septic with resolved leukocytosis and no active signs of meningitis on exam  - will follow  - will de-escalate antimicrobials as needed              Anticipated Disposition: TBD    Thank you for your consult. I will follow-up with patient. Please contact us if you have any additional questions.    CARI Chamorro  Infectious Disease  [facility]    Subjective:     Principal Problem:Sepsis    HPI: 57 y/o F with hx of HTN (on lisinopril and atenolol ) , smoker, and with chronic back pain (s/p 3 surgeries, on opioids for pain) presents to  [facility] as a transfer from [facility] ED. Patient was brought to [facility] ED after she was found confused and obtunded. Daughter states that patient had vomitus on her. She also notes jerking  "motions that have been concerning her for seizures. There is concern that pt may have taken opioids, although pt denied. Patient states that she likely hit the back of her head as she is having occipital HA and a "bump". She also notes dry/ cracked lips (possibily of biting her tongue). Pt also states she has been having a cough for the past 3 weeks and has been having diarrhea. She interacts with a dog and days she has received multiple scratches on her arms.  She reports dog has had all its shots.  Last time pt was seen within her normal states of health was on 6/2 around 5 pm, on 6/3 around 10AM she was found on the sofa and altered. The day prior to that pt was riding her bicycle (less than 1 mi distance). Pt denies any hx of seizures, recent f/v, abd pain, dysuria, paresthesias, neuro deficits, CP or SOB, sick contacts, or different eating habits.    At [facility] ED, patient was found to be hypotensive/ hypovolemic, in JACEY with Cr of 3.5, K+ of 7.1, bicarb of 18, CPK of 1240. WBC Of 21K. CT head was unremarkable. UA with evidence of UTI (WBC 40, moderate bacteria but no growth on cultures). She received IV rocephin and 4L NS with improvement in her encephalopathy. Her K improved from 7.1 --> 6.1 after shifting with insulin and albuterol. And her Cr improved from 3.5 --> 2.5 with IVF. On admission at [facility], pt was communicating appropriately, with no neuro deficits.     Currently she complains of global HA, photophobia, feels cold.  She has nausea and diarrhea.  She has neck and back pain that is at he baseline.    Interval History: No AEON.  Feels much better and HA decreased by 75%.  Going for LP today.  The patient denies any recent fever, chills, or sweats.      Review of Systems   Constitutional: Negative for activity change, chills, diaphoresis and fever.   HENT: Positive for mouth sores (improved).    Respiratory: Negative for cough, shortness of breath and wheezing.    Cardiovascular: Negative for chest " pain.   Gastrointestinal: Negative for abdominal pain, constipation, diarrhea, nausea and vomiting.   Genitourinary: Negative for dysuria, frequency and urgency.   Musculoskeletal: Positive for back pain and neck pain.        Chronic neck and back pain     Neurological: Positive for headaches (improved). Negative for dizziness.   Hematological: Does not bruise/bleed easily.     Objective:     Vital Signs (Most Recent):  Temp: 98.6 °F (37 °C) (06/07/17 1156)  Pulse: 76 (06/07/17 1156)  Resp: 18 (06/07/17 1156)  BP: (!) 147/97 (06/07/17 1156)  SpO2: 97 % (06/07/17 1156) Vital Signs (24h Range):  Temp:  [97.9 °F (36.6 °C)-99.7 °F (37.6 °C)] 98.6 °F (37 °C)  Pulse:  [] 76  Resp:  [17-18] 18  SpO2:  [94 %-97 %] 97 %  BP: (138-175)/() 147/97     Weight: 87.5 kg (192 lb 14.4 oz)  Body mass index is 37.67 kg/m².    Estimated Creatinine Clearance: 88.3 mL/min (based on Cr of 0.7).    Physical Exam   Constitutional: She is oriented to person, place, and time. She appears well-developed and well-nourished. No distress.   Appears more alert /cogent     HENT:   Head: Normocephalic and atraumatic.       Mouth/Throat: Uvula is midline, oropharynx is clear and moist and mucous membranes are normal. No oral lesions.       Eyes: EOM are normal. Pupils are equal, round, and reactive to light. No scleral icterus.   Cardiovascular: Normal rate, regular rhythm and normal heart sounds.  Exam reveals no gallop and no friction rub.    No murmur heard.  Pulmonary/Chest: Effort normal and breath sounds normal. No respiratory distress. She has no wheezes. She has no rales.   Abdominal: Soft. Bowel sounds are normal. She exhibits no distension and no mass. There is no hepatosplenomegaly. There is no tenderness. There is no rebound and no guarding.   Musculoskeletal: She exhibits no edema.   Neurological: She is alert and oriented to person, place, and time.   Negative Kernig/Brudzinski   Skin: Skin is warm, dry and intact. No rash  noted.   Psychiatric: She has a normal mood and affect. Her behavior is normal.   Slightly confused         Significant Labs:   Blood Culture:   Recent Labs  Lab 06/03/17  1219 06/05/17  1148   LABBLOO Gram stain veronika bottle: Gram positive rods   Results called to and read back by:Arely Jorgensen RN 06/05/2017  04:16  No Growth to date  No Growth to date  No Growth to date  No Growth to date No Growth to date  No Growth to date  No Growth to date  No Growth to date     CBC:   Recent Labs  Lab 06/06/17  0506 06/07/17 0618   WBC 14.38* 10.18   HGB 11.9* 12.4   HCT 35.6* 37.1   * 138*     CMP:   Recent Labs  Lab 06/06/17  0506 06/07/17 0618    139   K 3.4* 3.6    104   CO2 28 26    117*   BUN 13 10   CREATININE 0.7 0.7   CALCIUM 8.2* 8.4*   PROT 5.5* 5.8*   ALBUMIN 2.7* 2.9*   BILITOT 0.6 0.5   ALKPHOS 80 118   AST 42* 100*   ALT 44 99*   ANIONGAP 7* 9   EGFRNONAA >60.0 >60.0     All pertinent labs within the past 24 hours have been reviewed.    Significant Imaging: I have reviewed all pertinent imaging results/findings within the past 24 hours.   X-Ray Chest PA And Lateral [[id number]] Resulted: 06/04/17 1231   Order Status: Completed Updated: 06/04/17 1232   Narrative:     2 views: Heart size is normal.  Lungs are clear.  Bones reveal DJD.   Impression:      No acute process seen.      Electronically signed by: SERENA WHALEY  Date: 06/04/17  Time: 12:31    US Abdomen Limited [[id number]] Resulted: 06/04/17 0904   Order Status: Completed Updated: 06/04/17 0904   Narrative:     Time of Procedure: 06/04/17 07:08:00  Accession # [id number]    Reason for study: Rule out cholecystitis    Comparison: None.    Technique: Limited right upper quadrant ultrasound was performed.    Findings: The liver is enlarged measuring 19.2 cm extending below the costal margin demonstrating findings most suggestive of hepatic steatosis.  No focal hepatic parenchymal abnormality. No intra- or extrahepatic  biliary ductal dilatation. The common bile duct measures 0.4 cm.  The gallbladder demonstrates a solitary mobile stone measuring 0.5 cm. No other secondary evidence to suggest acute cholecystitis.  Sonographic Thurston's sign is negative. The visualized portions of the pancreas appear normal. The spleen is normal and measures 8.7 x 3.5 cm. No ascites.   Impression:         Cholelithiasis.    Hepatomegaly and hepatic steatosis.    ______________________________________     Electronically signed by resident: IRINA HERNANDEZ MD  Date: 06/04/17  Time: 08:18            As the supervising and teaching physician, I personally reviewed the images and resident's interpretation and I agree with the findings.          Electronically signed by: Ivett Sousa  Date: 06/04/17  Time: 09:04

## 2017-06-07 NOTE — SUBJECTIVE & OBJECTIVE
Interval History: No AEON.  Feels much better and HA decreased by 75%.  Going for LP today.  The patient denies any recent fever, chills, or sweats.      Review of Systems   Constitutional: Negative for activity change, chills, diaphoresis and fever.   HENT: Positive for mouth sores (improved).    Respiratory: Negative for cough, shortness of breath and wheezing.    Cardiovascular: Negative for chest pain.   Gastrointestinal: Negative for abdominal pain, constipation, diarrhea, nausea and vomiting.   Genitourinary: Negative for dysuria, frequency and urgency.   Musculoskeletal: Positive for back pain and neck pain.        Chronic neck and back pain     Neurological: Positive for headaches (improved). Negative for dizziness.   Hematological: Does not bruise/bleed easily.     Objective:     Vital Signs (Most Recent):  Temp: 98.6 °F (37 °C) (06/07/17 1156)  Pulse: 76 (06/07/17 1156)  Resp: 18 (06/07/17 1156)  BP: (!) 147/97 (06/07/17 1156)  SpO2: 97 % (06/07/17 1156) Vital Signs (24h Range):  Temp:  [97.9 °F (36.6 °C)-99.7 °F (37.6 °C)] 98.6 °F (37 °C)  Pulse:  [] 76  Resp:  [17-18] 18  SpO2:  [94 %-97 %] 97 %  BP: (138-175)/() 147/97     Weight: 87.5 kg (192 lb 14.4 oz)  Body mass index is 37.67 kg/m².    Estimated Creatinine Clearance: 88.3 mL/min (based on Cr of 0.7).    Physical Exam   Constitutional: She is oriented to person, place, and time. She appears well-developed and well-nourished. No distress.   Appears more alert /cogent     HENT:   Head: Normocephalic and atraumatic.       Mouth/Throat: Uvula is midline, oropharynx is clear and moist and mucous membranes are normal. No oral lesions.       Eyes: EOM are normal. Pupils are equal, round, and reactive to light. No scleral icterus.   Cardiovascular: Normal rate, regular rhythm and normal heart sounds.  Exam reveals no gallop and no friction rub.    No murmur heard.  Pulmonary/Chest: Effort normal and breath sounds normal. No respiratory distress.  She has no wheezes. She has no rales.   Abdominal: Soft. Bowel sounds are normal. She exhibits no distension and no mass. There is no hepatosplenomegaly. There is no tenderness. There is no rebound and no guarding.   Musculoskeletal: She exhibits no edema.   Neurological: She is alert and oriented to person, place, and time.   Negative Kernig/Brudzinski   Skin: Skin is warm, dry and intact. No rash noted.   Psychiatric: She has a normal mood and affect. Her behavior is normal.   Slightly confused         Significant Labs:   Blood Culture:   Recent Labs  Lab 06/03/17  1219 06/05/17  1148   LABBLOO Gram stain veronika bottle: Gram positive rods   Results called to and read back by:Arely Jorgensen RN 06/05/2017  04:16  No Growth to date  No Growth to date  No Growth to date  No Growth to date No Growth to date  No Growth to date  No Growth to date  No Growth to date     CBC:   Recent Labs  Lab 06/06/17  0506 06/07/17  0618   WBC 14.38* 10.18   HGB 11.9* 12.4   HCT 35.6* 37.1   * 138*     CMP:   Recent Labs  Lab 06/06/17  0506 06/07/17  0618    139   K 3.4* 3.6    104   CO2 28 26    117*   BUN 13 10   CREATININE 0.7 0.7   CALCIUM 8.2* 8.4*   PROT 5.5* 5.8*   ALBUMIN 2.7* 2.9*   BILITOT 0.6 0.5   ALKPHOS 80 118   AST 42* 100*   ALT 44 99*   ANIONGAP 7* 9   EGFRNONAA >60.0 >60.0     All pertinent labs within the past 24 hours have been reviewed.    Significant Imaging: I have reviewed all pertinent imaging results/findings within the past 24 hours.   X-Ray Chest PA And Lateral [[id number]] Resulted: 06/04/17 1231   Order Status: Completed Updated: 06/04/17 1232   Narrative:     2 views: Heart size is normal.  Lungs are clear.  Bones reveal DJD.   Impression:      No acute process seen.      Electronically signed by: SERENA WHALEY  Date: 06/04/17  Time: 12:31    US Abdomen Limited [[id number]] Resulted: 06/04/17 0904   Order Status: Completed Updated: 06/04/17 0904   Narrative:     Time of  Procedure: 06/04/17 07:08:00  Accession # [id number]    Reason for study: Rule out cholecystitis    Comparison: None.    Technique: Limited right upper quadrant ultrasound was performed.    Findings: The liver is enlarged measuring 19.2 cm extending below the costal margin demonstrating findings most suggestive of hepatic steatosis.  No focal hepatic parenchymal abnormality. No intra- or extrahepatic biliary ductal dilatation. The common bile duct measures 0.4 cm.  The gallbladder demonstrates a solitary mobile stone measuring 0.5 cm. No other secondary evidence to suggest acute cholecystitis.  Sonographic Thurston's sign is negative. The visualized portions of the pancreas appear normal. The spleen is normal and measures 8.7 x 3.5 cm. No ascites.   Impression:         Cholelithiasis.    Hepatomegaly and hepatic steatosis.    ______________________________________     Electronically signed by resident: IRINA HERNANDEZ MD  Date: 06/04/17  Time: 08:18            As the supervising and teaching physician, I personally reviewed the images and resident's interpretation and I agree with the findings.          Electronically signed by: Ivett Sousa  Date: 06/04/17  Time: 09:04
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
## 2017-06-07 NOTE — SUBJECTIVE & OBJECTIVE
"Interval History:   No acute events overnight. Patient is anxious for lumbar puncture later this afternoon. Otherwise, patient is angry that her EEG probes are still on her and would like them off. With some "dark" diarrhea overnight. Denies abdominal pain and states that bilateral flank pain is resolving. Denies fevers, chills, nausea, vomiting.     Review of Systems   Constitutional: Negative for activity change, appetite change, chills, diaphoresis, fatigue, fever and unexpected weight change.   HENT: Positive for mouth sores (Tongue apthous ulcers ).    Eyes: Negative for photophobia, pain, redness and visual disturbance.   Respiratory: Negative for cough and shortness of breath.    Cardiovascular: Negative for chest pain.   Gastrointestinal: Positive for diarrhea and nausea. Negative for abdominal pain and vomiting.   Endocrine: Negative for cold intolerance and heat intolerance.   Genitourinary: Negative for dysuria.   Musculoskeletal: Positive for back pain. Negative for arthralgias and myalgias.   Skin: Negative for rash and wound.   Neurological: Positive for headaches. Negative for dizziness, weakness and numbness.   Psychiatric/Behavioral: Negative for confusion.     Objective:     Vital Signs (Most Recent):  Temp: 98 °F (36.7 °C) (06/07/17 1659)  Pulse: 74 (06/07/17 1659)  Resp: 18 (06/07/17 1659)  BP: (!) 187/112 (06/07/17 1659)  SpO2: 97 % (06/07/17 1659) Vital Signs (24h Range):  Temp:  [97.9 °F (36.6 °C)-99.7 °F (37.6 °C)] 98 °F (36.7 °C)  Pulse:  [] 74  Resp:  [17-18] 18  SpO2:  [94 %-97 %] 97 %  BP: (147-187)/() 187/112     Weight: 87.5 kg (192 lb 14.4 oz)  Body mass index is 37.67 kg/m².    Intake/Output Summary (Last 24 hours) at 06/07/17 1813  Last data filed at 06/07/17 0331   Gross per 24 hour   Intake              640 ml   Output                0 ml   Net              640 ml      Physical Exam   Constitutional: She is oriented to person, place, and time. She appears well-developed " "and well-nourished. No distress.   HENT:   2-3 "knot", tender to palpation in occipital region    Eyes: Conjunctivae and EOM are normal. Pupils are equal, round, and reactive to light. No scleral icterus.   Neck: Normal range of motion. Neck supple.   Cardiovascular: Regular rhythm, normal heart sounds and intact distal pulses.    Pulmonary/Chest: Effort normal and breath sounds normal.   Abdominal: Soft. Bowel sounds are normal.   Musculoskeletal: Normal range of motion. She exhibits no edema or tenderness.   Neurological: She is alert and oriented to person, place, and time. She has normal reflexes.   Skin: She is not diaphoretic.   Psychiatric: She has a normal mood and affect. Her behavior is normal. Judgment and thought content normal.       Significant Labs:   BMP:   Recent Labs  Lab 06/07/17  0618   *      K 3.6      CO2 26   BUN 10   CREATININE 0.7   CALCIUM 8.4*   MG 1.9     CBC:   Recent Labs  Lab 06/06/17  0506 06/07/17  0618   WBC 14.38* 10.18   HGB 11.9* 12.4   HCT 35.6* 37.1   * 138*       Significant Imaging:   Imaging Results          FL Lumbar Puncture (xpd) (Preliminary result)  Result time 06/07/17 15:49:55    Preliminary result by Alan Johns MD (06/07/17 15:49:55)                 Impression:      Successful lumbar puncture under fluoroscopic guidance as detailed above.  ______________________________________     Electronically signed by resident: ALAN JOHNS MD  Date:     06/07/17  Time:    15:49            As the supervising and teaching physician, I personally reviewed the images and resident's interpretation and I agree with the findings.               Narrative:    [id number]  06/07/17  14:39:12 [id number] ([facility]) : FL LUMBAR PUNCTURE    ADDITIONAL CLINICAL HISTORY: History of encephalopathy.  Rule out meningitis.    TECHNIQUE:   Informed written consent was obtained from the patient.  The risks, benefits, and alternatives to the exam were discussed.  The patient was placed " in the prone position on the fluoroscopy table and was prepped and draped in a sterile fashion.  Local anesthesia was achieved using 1% lidocaine solution.  The right L3-4 interspace was localized and a 22-gauge spinal needle and stylet were advanced into the thecal sac under fluoroscopic guidance.    Total fluoroscopy time 0.5 minutes.    FINDINGS:  Approximately 7 cc of clear cerebrospinal fluid was obtained and sent to the laboratory with orders per referring physician.  The opening and closing pressures were 24 and 20 cm H2O respectively.  The stylet was replaced and the spinal needle was removed.  There were no immediate post procedure complications.                             X-Ray Chest PA And Lateral (Final result)  Result time 06/04/17 12:31:39    Final result by Antonino Whaley III, MD (06/04/17 12:31:39)                 Impression:     No acute process seen.      Electronically signed by: ANTONINO WHALEY  Date:     06/04/17  Time:    12:31              Narrative:    2 views: Heart size is normal.  Lungs are clear.  Bones reveal DJD.                             US Abdomen Limited (Final result)  Result time 06/04/17 09:04:33    Final result by Ivett Sousa MD (06/04/17 09:04:33)                 Impression:        Cholelithiasis.    Hepatomegaly and hepatic steatosis.    ______________________________________     Electronically signed by resident: IRINA HERNANDEZ MD  Date:     06/04/17  Time:    08:18            As the supervising and teaching physician, I personally reviewed the images and resident's interpretation and I agree with the findings.          Electronically signed by: Ivett Sousa  Date:     06/04/17  Time:    09:04              Narrative:    Time of Procedure: 06/04/17 07:08:00  Accession # [id number]    Reason for study: Rule out cholecystitis    Comparison: None.    Technique: Limited right upper quadrant ultrasound was performed.    Findings: The liver is enlarged measuring 19.2 cm  extending below the costal margin demonstrating findings most suggestive of hepatic steatosis.  No focal hepatic parenchymal abnormality. No intra- or extrahepatic biliary ductal dilatation. The common bile duct measures 0.4 cm.  The gallbladder demonstrates a solitary mobile stone measuring 0.5 cm. No other secondary evidence to suggest acute cholecystitis.  Sonographic Thurston's sign is negative. The visualized portions of the pancreas appear normal. The spleen is normal and measures 8.7 x 3.5 cm. No ascites.

## 2017-06-07 NOTE — NURSING
Notified MD Devine via person of pt /100 Pulse 107, no new orders given, will continue to monitor.

## 2017-06-07 NOTE — ASSESSMENT & PLAN NOTE
- Negative for C. Diff. Holding anti-motilities medications at this time.   - No significant abdominal pain or discomfort with abdominal US demonstrable for hepatomegaly, hepatic steatosis, and cholelithiasis.  - S/p one positive blood culture for Eubacterium limosum on 6/3 with no positive cultures since.   - Possibly Eubacterium enteritis as it is known for GI, urethral, pelvic, soft tissue infections but is rare.

## 2017-06-07 NOTE — PROCEDURES
Radiology Post-Procedure Note    Pre Op Diagnosis: Enephalopathy    Post Op Diagnosis: Same    Procedure: lumbar puncture    Procedure performed by: Dev Adams DO and Alan Johns MD    Written Informed Consent Obtained: Yes    Specimen Removed: 7 mL CSF    Estimated Blood Loss: Minimal    Findings: Following written informed consent and sterile prep and drape, a 22 gauge spinal needle was inserted at L3-L4 intralaminar space under fluoroscopic surveillance.  7 mL clear CSF removed and sent to the lab for further analysis.  There were no complications.  Opening and closing pressures were 24 and 20 cm H20 respectively.    Patient tolerated procedure well.    Alan Johns MD  Resident  Department of Radiology  Pager: [phone]

## 2017-06-08 PROBLEM — N20.0 LEFT NEPHROLITHIASIS: Status: ACTIVE | Noted: 2017-06-08

## 2017-06-08 PROBLEM — R74.8 ELEVATED LIVER ENZYMES: Status: ACTIVE | Noted: 2017-06-08

## 2017-06-08 LAB
ALBUMIN SERPL BCP-MCNC: 2.8 G/DL
ALP SERPL-CCNC: 113 U/L
ALT SERPL W/O P-5'-P-CCNC: 120 U/L
ANION GAP SERPL CALC-SCNC: 9 MMOL/L
AST SERPL-CCNC: 102 U/L
BACTERIA BLD CULT: NORMAL
BASOPHILS # BLD AUTO: 0.03 K/UL
BASOPHILS NFR BLD: 0.3 %
BILIRUB SERPL-MCNC: 0.4 MG/DL
BUN SERPL-MCNC: 12 MG/DL
CALCIUM SERPL-MCNC: 8.5 MG/DL
CHLORIDE SERPL-SCNC: 107 MMOL/L
CLARITY CSF: CLEAR
CO2 SERPL-SCNC: 25 MMOL/L
COLOR CSF: COLORLESS
CREAT SERPL-MCNC: 0.7 MG/DL
CSF, COMMENT: ABNORMAL
DIFFERENTIAL METHOD: ABNORMAL
EOSINOPHIL # BLD AUTO: 0.3 K/UL
EOSINOPHIL NFR BLD: 3.1 %
ERYTHROCYTE [DISTWIDTH] IN BLOOD BY AUTOMATED COUNT: 12.6 %
EST. GFR  (AFRICAN AMERICAN): >60 ML/MIN/1.73 M^2
EST. GFR  (NON AFRICAN AMERICAN): >60 ML/MIN/1.73 M^2
GLUCOSE SERPL-MCNC: 100 MG/DL
HCT VFR BLD AUTO: 36.6 %
HGB BLD-MCNC: 12.5 G/DL
HSV1, PCR, CSF: NEGATIVE
HSV2, PCR, CSF: NEGATIVE
LYMPHOCYTES # BLD AUTO: 3.2 K/UL
LYMPHOCYTES NFR BLD: 34 %
LYMPHOCYTES NFR CSF MANUAL: 22 %
MAGNESIUM SERPL-MCNC: 1.9 MG/DL
MCH RBC QN AUTO: 31.6 PG
MCHC RBC AUTO-ENTMCNC: 34.2 %
MCV RBC AUTO: 92 FL
MONOCYTES # BLD AUTO: 0.9 K/UL
MONOCYTES NFR BLD: 9.7 %
MONOS+MACROS NFR CSF MANUAL: 25 %
NEUTROPHILS # BLD AUTO: 4.9 K/UL
NEUTROPHILS NFR BLD: 51.7 %
NEUTROPHILS NFR CSF MANUAL: 53 %
PLATELET # BLD AUTO: 142 K/UL
PMV BLD AUTO: 9.8 FL
POTASSIUM SERPL-SCNC: 3.4 MMOL/L
POTASSIUM SERPL-SCNC: 3.6 MMOL/L
PROT SERPL-MCNC: 5.9 G/DL
RBC # BLD AUTO: 3.96 M/UL
RBC # CSF: 214 /CU MM
SODIUM SERPL-SCNC: 141 MMOL/L
SPECIMEN VOL CSF: 1 ML
WBC # BLD AUTO: 9.45 K/UL
WBC # CSF: 2 /CU MM

## 2017-06-08 PROCEDURE — 63600175 PHARM REV CODE 636 W HCPCS: Performed by: PHYSICIAN ASSISTANT

## 2017-06-08 PROCEDURE — 83735 ASSAY OF MAGNESIUM: CPT

## 2017-06-08 PROCEDURE — 25000003 PHARM REV CODE 250: Performed by: PHYSICIAN ASSISTANT

## 2017-06-08 PROCEDURE — 25000003 PHARM REV CODE 250: Performed by: STUDENT IN AN ORGANIZED HEALTH CARE EDUCATION/TRAINING PROGRAM

## 2017-06-08 PROCEDURE — 11000001 HC ACUTE MED/SURG PRIVATE ROOM

## 2017-06-08 PROCEDURE — 99233 SBSQ HOSP IP/OBS HIGH 50: CPT | Mod: ,,, | Performed by: PHYSICIAN ASSISTANT

## 2017-06-08 PROCEDURE — 25000003 PHARM REV CODE 250: Performed by: HOSPITALIST

## 2017-06-08 PROCEDURE — 80053 COMPREHEN METABOLIC PANEL: CPT

## 2017-06-08 PROCEDURE — 63600175 PHARM REV CODE 636 W HCPCS: Performed by: STUDENT IN AN ORGANIZED HEALTH CARE EDUCATION/TRAINING PROGRAM

## 2017-06-08 PROCEDURE — 36415 COLL VENOUS BLD VENIPUNCTURE: CPT

## 2017-06-08 PROCEDURE — 85025 COMPLETE CBC W/AUTO DIFF WBC: CPT

## 2017-06-08 PROCEDURE — 84132 ASSAY OF SERUM POTASSIUM: CPT

## 2017-06-08 RX ORDER — ATORVASTATIN CALCIUM 20 MG/1
20 TABLET, FILM COATED ORAL DAILY
Status: CANCELLED | OUTPATIENT
Start: 2017-06-08 | End: 2018-06-08

## 2017-06-08 RX ORDER — ATENOLOL 25 MG/1
25 TABLET ORAL DAILY
Status: DISCONTINUED | OUTPATIENT
Start: 2017-06-08 | End: 2017-06-09 | Stop reason: HOSPADM

## 2017-06-08 RX ORDER — ENOXAPARIN SODIUM 100 MG/ML
40 INJECTION SUBCUTANEOUS EVERY 24 HOURS
Status: DISCONTINUED | OUTPATIENT
Start: 2017-06-08 | End: 2017-06-09 | Stop reason: HOSPADM

## 2017-06-08 RX ORDER — POTASSIUM CHLORIDE 20 MEQ/1
60 TABLET, EXTENDED RELEASE ORAL DAILY
Status: DISCONTINUED | OUTPATIENT
Start: 2017-06-08 | End: 2017-06-08

## 2017-06-08 RX ADMIN — ENOXAPARIN SODIUM 40 MG: 100 INJECTION SUBCUTANEOUS at 05:06

## 2017-06-08 RX ADMIN — ATENOLOL 25 MG: 25 TABLET ORAL at 02:06

## 2017-06-08 RX ADMIN — TRAMADOL HYDROCHLORIDE 50 MG: 50 TABLET, COATED ORAL at 05:06

## 2017-06-08 RX ADMIN — ACYCLOVIR SODIUM 460 MG: 50 INJECTION, SOLUTION INTRAVENOUS at 01:06

## 2017-06-08 RX ADMIN — CEFTRIAXONE 2 G: 2 INJECTION, SOLUTION INTRAVENOUS at 06:06

## 2017-06-08 RX ADMIN — AMPICILLIN SODIUM AND SULBACTAM SODIUM 3 G: 2; 1 INJECTION, POWDER, FOR SOLUTION INTRAMUSCULAR; INTRAVENOUS at 10:06

## 2017-06-08 RX ADMIN — VANCOMYCIN HYDROCHLORIDE 1500 MG: 100 INJECTION, POWDER, LYOPHILIZED, FOR SOLUTION INTRAVENOUS at 08:06

## 2017-06-08 RX ADMIN — TRAZODONE HYDROCHLORIDE 150 MG: 100 TABLET ORAL at 09:06

## 2017-06-08 RX ADMIN — TRAMADOL HYDROCHLORIDE 50 MG: 50 TABLET, COATED ORAL at 06:06

## 2017-06-08 RX ADMIN — AMPICILLIN SODIUM 4000 MG: 2 INJECTION, POWDER, FOR SOLUTION INTRAMUSCULAR; INTRAVENOUS at 02:06

## 2017-06-08 RX ADMIN — ACYCLOVIR SODIUM 460 MG: 50 INJECTION, SOLUTION INTRAVENOUS at 05:06

## 2017-06-08 RX ADMIN — HYDRALAZINE HYDROCHLORIDE 25 MG: 25 TABLET, FILM COATED ORAL at 09:06

## 2017-06-08 RX ADMIN — AMPICILLIN SODIUM AND SULBACTAM SODIUM 3 G: 2; 1 INJECTION, POWDER, FOR SOLUTION INTRAMUSCULAR; INTRAVENOUS at 05:06

## 2017-06-08 RX ADMIN — ATORVASTATIN CALCIUM 20 MG: 20 TABLET, FILM COATED ORAL at 05:06

## 2017-06-08 RX ADMIN — AMPICILLIN SODIUM 4000 MG: 2 INJECTION, POWDER, FOR SOLUTION INTRAMUSCULAR; INTRAVENOUS at 06:06

## 2017-06-08 RX ADMIN — LISINOPRIL 10 MG: 10 TABLET ORAL at 08:06

## 2017-06-08 RX ADMIN — GABAPENTIN 600 MG: 300 CAPSULE ORAL at 05:06

## 2017-06-08 RX ADMIN — DULOXETINE 60 MG: 60 CAPSULE, DELAYED RELEASE ORAL at 08:06

## 2017-06-08 RX ADMIN — GABAPENTIN 600 MG: 300 CAPSULE ORAL at 01:06

## 2017-06-08 RX ADMIN — POTASSIUM CHLORIDE 60 MEQ: 1500 TABLET, EXTENDED RELEASE ORAL at 08:06

## 2017-06-08 RX ADMIN — GABAPENTIN 600 MG: 300 CAPSULE ORAL at 09:06

## 2017-06-08 NOTE — SUBJECTIVE & OBJECTIVE
Interval History:   Patient states she feels 100% better and would like to leave. Still with some watery dark diarrhea overnight without abdominal pain or distention. Patient's headache is only mild this morning and still in the back where she had bumped her head. Otherwise, tongue sores are improving and she has had good appetite. Denies dysuria or polyuria. Left flank pain improved but still present. Otherwise denies vision changes, fevers, chills, nausea, vomiting, confusion, lightheadedness, or weakness.    Review of Systems   Constitutional: Negative for activity change, appetite change, chills, diaphoresis, fatigue, fever and unexpected weight change.   HENT: Positive for mouth sores (Tongue apthous ulcers ).    Eyes: Negative for photophobia, pain, redness and visual disturbance.   Respiratory: Negative for cough and shortness of breath.    Cardiovascular: Negative for chest pain.   Gastrointestinal: Positive for diarrhea. Negative for abdominal pain, nausea and vomiting.   Endocrine: Negative for cold intolerance and heat intolerance.   Genitourinary: Negative for dysuria.   Musculoskeletal: Positive for back pain. Negative for arthralgias and myalgias.   Skin: Negative for rash and wound.   Neurological: Positive for headaches. Negative for dizziness, weakness and numbness.   Psychiatric/Behavioral: Negative for confusion.     Objective:     Vital Signs (Most Recent):  Temp: 97.9 °F (36.6 °C) (06/08/17 1100)  Pulse: 96 (06/08/17 1100)  Resp: 18 (06/08/17 1100)  BP: (!) 163/91 (06/08/17 1100)  SpO2: (!) 93 % (06/08/17 1100) Vital Signs (24h Range):  Temp:  [97.9 °F (36.6 °C)-99 °F (37.2 °C)] 97.9 °F (36.6 °C)  Pulse:  [61-96] 96  Resp:  [18] 18  SpO2:  [93 %-98 %] 93 %  BP: (139-187)/() 163/91     Weight: 87.5 kg (192 lb 14.4 oz)  Body mass index is 37.67 kg/m².    Intake/Output Summary (Last 24 hours) at 06/08/17 1644  Last data filed at 06/08/17 0600   Gross per 24 hour   Intake              120 ml  "  Output                0 ml   Net              120 ml      Physical Exam   Constitutional: She is oriented to person, place, and time. She appears well-developed and well-nourished. No distress.   HENT:   2-3 "knot", tender to palpation in occipital region    Eyes: Conjunctivae and EOM are normal. Pupils are equal, round, and reactive to light. No scleral icterus.   Neck: Normal range of motion. Neck supple.   Cardiovascular: Regular rhythm, normal heart sounds and intact distal pulses.    Pulmonary/Chest: Effort normal and breath sounds normal.   Abdominal: Soft. Bowel sounds are normal.   Musculoskeletal: Normal range of motion. She exhibits no edema or tenderness.   Neurological: She is alert and oriented to person, place, and time. She has normal reflexes.   Skin: She is not diaphoretic.   Psychiatric: She has a normal mood and affect. Her behavior is normal. Judgment and thought content normal.       Significant Labs:   BMP:   Recent Labs  Lab 06/08/17  0501 06/08/17  0732     --      --    K 3.6 3.4*     --    CO2 25  --    BUN 12  --    CREATININE 0.7  --    CALCIUM 8.5*  --    MG 1.9  --      CBC:   Recent Labs  Lab 06/07/17  0618 06/08/17  0501   WBC 10.18 9.45   HGB 12.4 12.5   HCT 37.1 36.6*   * 142*       Magnesium:    Recent Labs  Lab 06/07/17  0618 06/08/17  0501   MG 1.9 1.9       Significant Imaging:   Imaging Results          US Retroperitoneal Complete (Final result)  Result time 06/08/17 02:02:03    Final result by Norbert Powers MD (06/08/17 02:02:03)                 Impression:        Nonobstructing left nephrolithiasis.    Bilateral renal cysts.  ______________________________________     Electronically signed by resident: NATASHA DANIELS MD  Date:     06/08/17  Time:    01:57            As the supervising and teaching physician, I personally reviewed the images and resident's interpretation and I agree with the findings.          Electronically signed by: NORBERT" RODGER TELLEZ  Date:     06/08/17  Time:    02:02              Narrative:    Time of Procedure: 06/07/17 21:18:00  Accession # [id number]    Reason for study: Bilateral flank tenderness, left greater than right, concern for pyelonephritis     Comparison: None    Technique: Routine renal ultrasound was performed.    Findings: The kidneys are normal in size, measuring 11.2 cm on the right and 10.5 cm on the left. There is good corticomedullary differentiation and normal cortical thickness. The upper pole of the right kidney demonstrates a 1.1 cm cyst. The left kidney demonstrates 2 cysts, the larger of which measures 3.1 cm in the lower pole. There are 2 echogenic foci within the left kidney which likely represent calculi. No solid renal masses or hydronephrosis. Perfusion to the kidneys is normal. Resistive indices are normal and as follow: 0.59 on the right and 0.68 on the left. The urinary bladder appears unremarkable with bilateral ureteral joints demonstrated.                             FL Lumbar Puncture (xpd) (Preliminary result)  Result time 06/07/17 15:49:55    Preliminary result by Alan Johns MD (06/07/17 15:49:55)                 Impression:      Successful lumbar puncture under fluoroscopic guidance as detailed above.  ______________________________________     Electronically signed by resident: ALAN JOHNS MD  Date:     06/07/17  Time:    15:49            As the supervising and teaching physician, I personally reviewed the images and resident's interpretation and I agree with the findings.               Narrative:    [id number]  06/07/17  14:39:12 [id number] ([facility]) : FL LUMBAR PUNCTURE    ADDITIONAL CLINICAL HISTORY: History of encephalopathy.  Rule out meningitis.    TECHNIQUE:   Informed written consent was obtained from the patient.  The risks, benefits, and alternatives to the exam were discussed.  The patient was placed in the prone position on the fluoroscopy table and was prepped and draped in a sterile  fashion.  Local anesthesia was achieved using 1% lidocaine solution.  The right L3-4 interspace was localized and a 22-gauge spinal needle and stylet were advanced into the thecal sac under fluoroscopic guidance.    Total fluoroscopy time 0.5 minutes.    FINDINGS:  Approximately 7 cc of clear cerebrospinal fluid was obtained and sent to the laboratory with orders per referring physician.  The opening and closing pressures were 24 and 20 cm H2O respectively.  The stylet was replaced and the spinal needle was removed.  There were no immediate post procedure complications.                             X-Ray Chest PA And Lateral (Final result)  Result time 06/04/17 12:31:39    Final result by Antonino Whaley III, MD (06/04/17 12:31:39)                 Impression:     No acute process seen.      Electronically signed by: ANTONINO WHALEY  Date:     06/04/17  Time:    12:31              Narrative:    2 views: Heart size is normal.  Lungs are clear.  Bones reveal DJD.                             US Abdomen Limited (Final result)  Result time 06/04/17 09:04:33    Final result by Ivett Sousa MD (06/04/17 09:04:33)                 Impression:        Cholelithiasis.    Hepatomegaly and hepatic steatosis.    ______________________________________     Electronically signed by resident: IRINA HERNANDEZ MD  Date:     06/04/17  Time:    08:18            As the supervising and teaching physician, I personally reviewed the images and resident's interpretation and I agree with the findings.          Electronically signed by: Ivett Sousa  Date:     06/04/17  Time:    09:04              Narrative:    Time of Procedure: 06/04/17 07:08:00  Accession # [id number]    Reason for study: Rule out cholecystitis    Comparison: None.    Technique: Limited right upper quadrant ultrasound was performed.    Findings: The liver is enlarged measuring 19.2 cm extending below the costal margin demonstrating findings most suggestive of hepatic steatosis.   No focal hepatic parenchymal abnormality. No intra- or extrahepatic biliary ductal dilatation. The common bile duct measures 0.4 cm.  The gallbladder demonstrates a solitary mobile stone measuring 0.5 cm. No other secondary evidence to suggest acute cholecystitis.  Sonographic Thurston's sign is negative. The visualized portions of the pancreas appear normal. The spleen is normal and measures 8.7 x 3.5 cm. No ascites.

## 2017-06-08 NOTE — SUBJECTIVE & OBJECTIVE
Interval History:   No AEON.  still has headaches but has much improved since admission. LP significantly unremarkable, cultures negative. Repeat blood cultures negative.   The patient denies any recent fever, chills, or sweats.    Review of Systems   Constitutional: Negative for activity change, chills, diaphoresis and fever.   HENT: Positive for mouth sores (improved).    Respiratory: Negative for cough, shortness of breath and wheezing.    Cardiovascular: Negative for chest pain.   Gastrointestinal: Negative for abdominal pain, constipation, diarrhea, nausea and vomiting.   Genitourinary: Negative for dysuria, frequency and urgency.   Musculoskeletal: Negative for back pain and neck pain.   Neurological: Positive for headaches (improved). Negative for dizziness.   Hematological: Does not bruise/bleed easily.     Objective:     Vital Signs (Most Recent):  Temp: 97.9 °F (36.6 °C) (06/08/17 1100)  Pulse: 96 (06/08/17 1100)  Resp: 18 (06/08/17 1100)  BP: (!) 163/91 (06/08/17 1100)  SpO2: (!) 93 % (06/08/17 1100) Vital Signs (24h Range):  Temp:  [97.9 °F (36.6 °C)-99 °F (37.2 °C)] 97.9 °F (36.6 °C)  Pulse:  [61-96] 96  Resp:  [18] 18  SpO2:  [93 %-98 %] 93 %  BP: (139-187)/() 163/91     Weight: 87.5 kg (192 lb 14.4 oz)  Body mass index is 37.67 kg/m².    Estimated Creatinine Clearance: 88.3 mL/min (based on Cr of 0.7).    Physical Exam   Constitutional: She is oriented to person, place, and time. She appears well-developed and well-nourished. No distress.   HENT:   Head: Normocephalic.   Eyes: Pupils are equal, round, and reactive to light.   Neck: Normal range of motion.   Cardiovascular: Normal rate, regular rhythm and normal heart sounds.  Exam reveals no gallop and no friction rub.    No murmur heard.  Pulmonary/Chest: Effort normal. No respiratory distress. She has no wheezes. She has no rales.   Abdominal: Soft. She exhibits no distension.   Musculoskeletal: Normal range of motion.   Neurological: She is  alert and oriented to person, place, and time.   Skin: Skin is warm. She is not diaphoretic. No erythema. No pallor.   Psychiatric: She has a normal mood and affect.   Nursing note and vitals reviewed.      Significant Labs:   Blood Culture:   Recent Labs  Lab 06/03/17  1219 06/05/17  1148   LABBLOO No Growth to date  No Growth to date  No Growth to date  No Growth to date  No Growth to date  Gram stain veronika bottle: Gram positive rods   Results called to and read back by:Arely Jorgensen RN 06/05/2017  04:16  EUBACTERIUM LIMOSUM No Growth to date  No Growth to date  No Growth to date  No Growth to date  No Growth to date  No Growth to date  No Growth to date  No Growth to date     CBC:   Recent Labs  Lab 06/07/17  0618 06/08/17  0501   WBC 10.18 9.45   HGB 12.4 12.5   HCT 37.1 36.6*   * 142*     CMP:   Recent Labs  Lab 06/07/17 0618 06/08/17  0501 06/08/17  0732    141  --    K 3.6 3.6 3.4*    107  --    CO2 26 25  --    * 100  --    BUN 10 12  --    CREATININE 0.7 0.7  --    CALCIUM 8.4* 8.5*  --    PROT 5.8* 5.9*  --    ALBUMIN 2.9* 2.8*  --    BILITOT 0.5 0.4  --    ALKPHOS 118 113  --    * 102*  --    ALT 99* 120*  --    ANIONGAP 9 9  --    EGFRNONAA >60.0 >60.0  --      All pertinent labs within the past 24 hours have been reviewed.    Significant Imaging: I have reviewed all pertinent imaging results/findings within the past 24 hours.

## 2017-06-08 NOTE — ASSESSMENT & PLAN NOTE
- Non-obstructing.  - Also with bilateral renal cysts.  - Likely cause of patients mild left flank tenderness.  - No surgical intervention while hospitalized.  - Most recent UA without hematuria or UTI.

## 2017-06-08 NOTE — PROGRESS NOTES
"[facility]  Infectious Disease  Progress Note    Patient Name: Lien Sotelo  MRN: [id number]  Admission Date: 6/3/2017  Length of Stay: 5 days  Attending Physician: Karuna Kauffman MD  Primary Care Provider: Primary Doctor No    Isolation Status: No active isolations  Assessment/Plan:      * Sepsis    - serum HSV 1/2 pcr (negative) and CMV IgM (WNL)  - CSF cell count WBC:1 , , Protein 26, Glucose 65. Cultures no growth, gram stain no WBC no organisms seen.  - Initial blood cultures 6/3 show EUBACTERIUM LIMOSUM which is often associated with GI illness (patient denies any diarrhea today)  -HSV CSF pending   - repeat blood cultures NGTD  - procalcitonin wnl    Plan:  1.Discontinue IV acyclovir, vancomycin,ceftriaxone, and ampicillin as CSF cell count and differential within normal limits, cultures negative to date and gram stain no growth.  2.Will start IV Unasyn for Eubacterium from 6/3.  3.Will continue to monitor for clinical decompensation  4.Stable non septic with resolved leukocytosis and no active signs of meningitis on exam    Discussed with ID staff. ID will follow with you.           Thank you for your consult. I will follow-up with patient. Please contact us if you have any additional questions.    Shawnee Bishop PA-C  Infectious Disease  [facility] [phone]    Subjective:     Principal Problem:Sepsis    HPI: 55 y/o F with hx of HTN (on lisinopril and atenolol ) , smoker, and with chronic back pain (s/p 3 surgeries, on opioids for pain) [place]  [facility] [place] from [facility] ED. Patient was brought to [facility] [place] ED after she was found confused and obtunded. Daughter states that patient had vomitus on her. She also notes jerking motions that have been concerning her for seizures. There is concern that pt may have taken opioids, although pt denied. Patient states that she likely hit the back of her head as she is having occipital HA and a "bump". " She also notes dry/ cracked lips (possibily of biting her tongue). Pt also states she has been having a cough for the past 3 weeks and has been having diarrhea. She interacts with a dog and days she has received multiple scratches on her arms.  She reports dog has had all its shots.  Last time pt was seen within her normal states of health was on 6/2 around 5 pm, on 6/3 around 10AM she was found on the sofa and altered. The day prior to that pt was riding her bicycle (less than 1 mi distance). Pt denies any hx of seizures, recent f/v, abd pain, dysuria, paresthesias, neuro deficits, CP or SOB, sick contacts, or different eating habits.    At [facility] ED, patient was found to be hypotensive/ hypovolemic, in JACEY with Cr of 3.5, K+ of 7.1, bicarb of 18, CPK of 1240. WBC Of 21K. CT head was unremarkable. UA with evidence of UTI (WBC 40, moderate bacteria but no growth on cultures). She received IV rocephin and 4L NS with improvement in her encephalopathy. Her K improved from 7.1 --> 6.1 after shifting with insulin and albuterol. And her Cr improved from 3.5 --> 2.5 with IVF. On admission at [facility], pt was communicating appropriately, with no neuro deficits.     Currently she complains of global HA, photophobia, feels cold.  She has nausea and diarrhea.  She has neck and back pain that is at he baseline.    Interval History:   No AEON.  still has headaches but has much improved since admission. LP significantly unremarkable, cultures negative. Repeat blood cultures negative.   The patient denies any recent fever, chills, or sweats.    Review of Systems   Constitutional: Negative for activity change, chills, diaphoresis and fever.   HENT: Positive for mouth sores (improved).    Respiratory: Negative for cough, shortness of breath and wheezing.    Cardiovascular: Negative for chest pain.   Gastrointestinal: Negative for abdominal pain, constipation, diarrhea, nausea and vomiting.   Genitourinary: Negative for dysuria,  frequency and urgency.   Musculoskeletal: Negative for back pain and neck pain.   Neurological: Positive for headaches (improved). Negative for dizziness.   Hematological: Does not bruise/bleed easily.     Objective:     Vital Signs (Most Recent):  Temp: 97.9 °F (36.6 °C) (06/08/17 1100)  Pulse: 96 (06/08/17 1100)  Resp: 18 (06/08/17 1100)  BP: (!) 163/91 (06/08/17 1100)  SpO2: (!) 93 % (06/08/17 1100) Vital Signs (24h Range):  Temp:  [97.9 °F (36.6 °C)-99 °F (37.2 °C)] 97.9 °F (36.6 °C)  Pulse:  [61-96] 96  Resp:  [18] 18  SpO2:  [93 %-98 %] 93 %  BP: (139-187)/() 163/91     Weight: 87.5 kg (192 lb 14.4 oz)  Body mass index is 37.67 kg/m².    Estimated Creatinine Clearance: 88.3 mL/min (based on Cr of 0.7).    Physical Exam   Constitutional: She is oriented to person, place, and time. She appears well-developed and well-nourished. No distress.   HENT:   Head: Normocephalic.   Eyes: Pupils are equal, round, and reactive to light.   Neck: Normal range of motion.   Cardiovascular: Normal rate, regular rhythm and normal heart sounds.  Exam reveals no gallop and no friction rub.    No murmur heard.  Pulmonary/Chest: Effort normal. No respiratory distress. She has no wheezes. She has no rales.   Abdominal: Soft. She exhibits no distension.   Musculoskeletal: Normal range of motion.   Neurological: She is alert and oriented to person, place, and time.   Skin: Skin is warm. She is not diaphoretic. No erythema. No pallor.   Psychiatric: She has a normal mood and affect.   Nursing note and vitals reviewed.      Significant Labs:   Blood Culture:   Recent Labs  Lab 06/03/17  1219 06/05/17  1148   LABBLOO No Growth to date  No Growth to date  No Growth to date  No Growth to date  No Growth to date  Gram stain veronika bottle: Gram positive rods   Results called to and read back by:Arely Jorgensen RN 06/05/2017  04:16  EUBACTERIUM LIMOSUM No Growth to date  No Growth to date  No Growth to date  No Growth to date   No Growth to date  No Growth to date  No Growth to date  No Growth to date     CBC:   Recent Labs  Lab 06/07/17 0618 06/08/17  0501   WBC 10.18 9.45   HGB 12.4 12.5   HCT 37.1 36.6*   * 142*     CMP:   Recent Labs  Lab 06/07/17 0618 06/08/17  0501 06/08/17  0732    141  --    K 3.6 3.6 3.4*    107  --    CO2 26 25  --    * 100  --    BUN 10 12  --    CREATININE 0.7 0.7  --    CALCIUM 8.4* 8.5*  --    PROT 5.8* 5.9*  --    ALBUMIN 2.9* 2.8*  --    BILITOT 0.5 0.4  --    ALKPHOS 118 113  --    * 102*  --    ALT 99* 120*  --    ANIONGAP 9 9  --    EGFRNONAA >60.0 >60.0  --      All pertinent labs within the past 24 hours have been reviewed.    Significant Imaging: I have reviewed all pertinent imaging results/findings within the past 24 hours.

## 2017-06-08 NOTE — ASSESSMENT & PLAN NOTE
- With leukocytosis (>20), JACEY, and altered mentation.  - Leukocytosis resolving with WBC now resolving to 9.5.  - LP performed on 6/6; results unconvincing for meningitis - see acute encephalopathy   - With some left flank tenderness with UA suspicious for UTI on admission but repeat unremarkable -retroperitoneal ultrasound ordered to rule out pyelonephritis but unlikely.

## 2017-06-08 NOTE — ASSESSMENT & PLAN NOTE
- Likely multifactorial, including septic encephalopathy, uremic encephalopathy, and polypharmacy with opiates (hydrocodone/tramadol). On admission -    - With UA on admission suspicious for UTI with leukocytosis   - Uremia at 71 with JACEY.   - Presumptive positive for opiates (patient takes Norco at home).  - Meningitis was considered but patient's mentation has resolved to baseline and without nuchal rigidity.  - Suspicious for seizure as well, although patient has no known seizure history  - Now resolved with IVF to treat JACEY/rhabdo and with sepsis therapy.  - Seizure precautions  - vEEG 24 hrs to r/u seizures, completed today.   - ID consulted and concern that patient with meningitis with mild nuchal rigidity, photophobia (now resolve) that put patient into seizure; suspicious that symptoms resolved with IV antiviral/antibiotics that were also treating meningitis. 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  - IR consult for fluoroscopy LP   - Opening pressure moderately elevated at 25 mm Hg.    - Glucose and protein normal at 65 and 26.    - WBC 1 and RBC significant (second aliquot) at 205.   - HSV pending, in consideration   - Yield of CSF unclear as patient has been broadly treated s/p 3 days; pending ID recommendations.     - ID recommends starting IV unasyn for Eubacterium limosum and d/c'ing other antibiotics and antivirals considering unremarkable tap and no meningeal irritation signs.    - Monitor for clinical decompensation.  - Consider consult to vascular neurology considering high RBC count in CSF - considerations for small SAH, AVM, or aneurysm.

## 2017-06-08 NOTE — PLAN OF CARE
Problem: Patient Care Overview  Goal: Plan of Care Review  Outcome: Ongoing (interventions implemented as appropriate)  POC reviewed with pt at 1800. Pt verbalized understanding. Questions and concerns addressed.  Pt complain of medication missing from home that pt brought to hospital.states it was given to EMS worker; transfer hospital investigating complaint; Pt progressing toward goals. Will continue to monitor. See flowsheet for full assessment and vs info.

## 2017-06-08 NOTE — PROGRESS NOTES
"[facility] Hospital Medicine  Progress Note    Patient Name: Lien Sotelo  MRN: [id number]  Patient Class: IP- Inpatient   Admission Date: 6/3/2017  Length of Stay: 5 days  Attending Physician: Karuna Kauffman MD  Primary Care Provider: Primary [facility] Medicine Team: [facility] [place] [place] 3 Anil Duke MD    Subjective:     Principal Problem:Sepsis    HPI:  55 y/o F with hx of HTN (on lisinopril and atenolol) ,smoker, and with chronic back pain (s/p 3 surgeries, on opioids for pain) [place]  [facility] [place] [facility] [place]. Patient was brought to [facility] ED after she was found confused and obtunded on her sofa at home. Daughter states that patient may have vomited. She also notes jerking motions that have been concerning her for seizures. There is concern that pt may have taken opioids, although pt denies. Patient states that she likely hit the back of her head as she is having occipital HA and a "bump". + blurred vision. She also notes dry/ cracked lips (possibily of biting her tongue). Pt also states she has been having a cough for the past few days. Last time pt was seen within her normal states of health was on 6/2 around 5 pm, on 6/3 around 10AM she was found on the sofa and altered. The day prior to that pt was riding her bicycle (less than 1 mi distance). Pt denies any hx of seizures, recent f/v, abd pain, dysuria, paresthesias, neuro deficits, CP or SOB    At [facility] ED, patient was found to be hypotensive/ hypovolemic, in JACEY with Cr of 3.5, K+ of 7.1, bicarb of 18, CPK of 1240. WBC Of 21K. CT head was unremarkable. UA with evidence of UTI. She received IV rocephin and 4L NS with improvement in her encephalopathy. Her K improved from 7.1 --> 6.1 after shifting with insulin and albuterol. And her Cr improved from 3.5 --> 2.5 with IVF. [place] [facility] [place] was communicating appropriately, with no neuro deficits.     Hospital Course:  Patient was admitted to " [facility] medicine 3. IVF were stopped and patient's mentation was normal by visit of the next morning. During the day patient began endorsing watery diarrheal events that were reported to be lightly bloody. Upon examination of the sample, appeared to look watery with pinkish tinge as light hematuria. Suspicious that patient may have voided urine during bowel events, although patient denied this occurring, as appearance could very well be urine secondary to rhabdomyolysis. H/H was checked and was found to be stable. Blood cultures from 6/4 were positive for gram positive rods. LP on 6/7 found glucose of 65 and protein of 26. RBC were elevated at 205 with WBC of 1. CSF cultures NGTD.       Interval History:   Patient states she feels 100% better and would like to leave. Still with some watery dark diarrhea overnight without abdominal pain or distention. Patient's headache is only mild this morning and still in the back where she had bumped her head. Otherwise, tongue sores are improving and she has had good appetite. Denies dysuria or polyuria. Left flank pain improved but still present. Otherwise denies vision changes, fevers, chills, nausea, vomiting, confusion, lightheadedness, or weakness.    Review of Systems   Constitutional: Negative for activity change, appetite change, chills, diaphoresis, fatigue, fever and unexpected weight change.   HENT: Positive for mouth sores (Tongue apthous ulcers ).    Eyes: Negative for photophobia, pain, redness and visual disturbance.   Respiratory: Negative for cough and shortness of breath.    Cardiovascular: Negative for chest pain.   Gastrointestinal: Positive for diarrhea. Negative for abdominal pain, nausea and vomiting.   Endocrine: Negative for cold intolerance and heat intolerance.   Genitourinary: Negative for dysuria.   Musculoskeletal: Positive for back pain. Negative for arthralgias and myalgias.   Skin: Negative for rash and wound.   Neurological: Positive for  "headaches. Negative for dizziness, weakness and numbness.   Psychiatric/Behavioral: Negative for confusion.     Objective:     Vital Signs (Most Recent):  Temp: 97.9 °F (36.6 °C) (06/08/17 1100)  Pulse: 96 (06/08/17 1100)  Resp: 18 (06/08/17 1100)  BP: (!) 163/91 (06/08/17 1100)  SpO2: (!) 93 % (06/08/17 1100) Vital Signs (24h Range):  Temp:  [97.9 °F (36.6 °C)-99 °F (37.2 °C)] 97.9 °F (36.6 °C)  Pulse:  [61-96] 96  Resp:  [18] 18  SpO2:  [93 %-98 %] 93 %  BP: (139-187)/() 163/91     Weight: 87.5 kg (192 lb 14.4 oz)  Body mass index is 37.67 kg/m².    Intake/Output Summary (Last 24 hours) at 06/08/17 1644  Last data filed at 06/08/17 0600   Gross per 24 hour   Intake              120 ml   Output                0 ml   Net              120 ml      Physical Exam   Constitutional: She is oriented to person, place, and time. She appears well-developed and well-nourished. No distress.   HENT:   2-3 "knot", tender to palpation in occipital region    Eyes: Conjunctivae and EOM are normal. Pupils are equal, round, and reactive to light. No scleral icterus.   Neck: Normal range of motion. Neck supple.   Cardiovascular: Regular rhythm, normal heart sounds and intact distal pulses.    Pulmonary/Chest: Effort normal and breath sounds normal.   Abdominal: Soft. Bowel sounds are normal.   Musculoskeletal: Normal range of motion. She exhibits no edema or tenderness.   Neurological: She is alert and oriented to person, place, and time. She has normal reflexes.   Skin: She is not diaphoretic.   Psychiatric: She has a normal mood and affect. Her behavior is normal. Judgment and thought content normal.       Significant Labs:   BMP:   Recent Labs  Lab 06/08/17  0501 06/08/17  0732     --      --    K 3.6 3.4*     --    CO2 25  --    BUN 12  --    CREATININE 0.7  --    CALCIUM 8.5*  --    MG 1.9  --      CBC:   Recent Labs  Lab 06/07/17  0618 06/08/17  0501   WBC 10.18 9.45   HGB 12.4 12.5   HCT 37.1 36.6*   PLT " 138* 142*       Magnesium:    Recent Labs  Lab 06/07/17  0618 06/08/17  0501   MG 1.9 1.9       Significant Imaging:   Imaging Results          US Retroperitoneal Complete (Final result)  Result time 06/08/17 02:02:03    Final result by Norbert Powers MD (06/08/17 02:02:03)                 Impression:        Nonobstructing left nephrolithiasis.    Bilateral renal cysts.  ______________________________________     Electronically signed by resident: NATASHA DANIELS MD  Date:     06/08/17  Time:    01:57            As the supervising and teaching physician, I personally reviewed the images and resident's interpretation and I agree with the findings.          Electronically signed by: NORBERT POWERS MD  Date:     06/08/17  Time:    02:02              Narrative:    Time of Procedure: 06/07/17 21:18:00  Accession # [id number]    Reason for study: Bilateral flank tenderness, left greater than right, concern for pyelonephritis     Comparison: None    Technique: Routine renal ultrasound was performed.    Findings: The kidneys are normal in size, measuring 11.2 cm on the right and 10.5 cm on the left. There is good corticomedullary differentiation and normal cortical thickness. The upper pole of the right kidney demonstrates a 1.1 cm cyst. The left kidney demonstrates 2 cysts, the larger of which measures 3.1 cm in the lower pole. There are 2 echogenic foci within the left kidney which likely represent calculi. No solid renal masses or hydronephrosis. Perfusion to the kidneys is normal. Resistive indices are normal and as follow: 0.59 on the right and 0.68 on the left. The urinary bladder appears unremarkable with bilateral ureteral joints demonstrated.                             FL Lumbar Puncture (xpd) (Preliminary result)  Result time 06/07/17 15:49:55    Preliminary result by Alan Johns MD (06/07/17 15:49:55)                 Impression:      Successful lumbar puncture under fluoroscopic guidance as detailed  above.  ______________________________________     Electronically signed by resident: IRINA HERNANDEZ MD  Date:     06/07/17  Time:    15:49            As the supervising and teaching physician, I personally reviewed the images and resident's interpretation and I agree with the findings.               Narrative:    [id number]  06/07/17  14:39:12 [id number] ([facility]) : FL LUMBAR PUNCTURE    ADDITIONAL CLINICAL HISTORY: History of encephalopathy.  Rule out meningitis.    TECHNIQUE:   Informed written consent was obtained from the patient.  The risks, benefits, and alternatives to the exam were discussed.  The patient was placed in the prone position on the fluoroscopy table and was prepped and draped in a sterile fashion.  Local anesthesia was achieved using 1% lidocaine solution.  The right L3-4 interspace was localized and a 22-gauge spinal needle and stylet were advanced into the thecal sac under fluoroscopic guidance.    Total fluoroscopy time 0.5 minutes.    FINDINGS:  Approximately 7 cc of clear cerebrospinal fluid was obtained and sent to the laboratory with orders per referring physician.  The opening and closing pressures were 24 and 20 cm H2O respectively.  The stylet was replaced and the spinal needle was removed.  There were no immediate post procedure complications.                             X-Ray Chest PA And Lateral (Final result)  Result time 06/04/17 12:31:39    Final result by Antonino Whaley III, MD (06/04/17 12:31:39)                 Impression:     No acute process seen.      Electronically signed by: ANTONINO WHALEY  Date:     06/04/17  Time:    12:31              Narrative:    2 views: Heart size is normal.  Lungs are clear.  Bones reveal DJD.                             US Abdomen Limited (Final result)  Result time 06/04/17 09:04:33    Final result by Ivett Sousa MD (06/04/17 09:04:33)                 Impression:        Cholelithiasis.    Hepatomegaly and hepatic  steatosis.    ______________________________________     Electronically signed by resident: IRINA HERNANDEZ MD  Date:     06/04/17  Time:    08:18            As the supervising and teaching physician, I personally reviewed the images and resident's interpretation and I agree with the findings.          Electronically signed by: Ivett Sousa  Date:     06/04/17  Time:    09:04              Narrative:    Time of Procedure: 06/04/17 07:08:00  Accession # [id number]    Reason for study: Rule out cholecystitis    Comparison: None.    Technique: Limited right upper quadrant ultrasound was performed.    Findings: The liver is enlarged measuring 19.2 cm extending below the costal margin demonstrating findings most suggestive of hepatic steatosis.  No focal hepatic parenchymal abnormality. No intra- or extrahepatic biliary ductal dilatation. The common bile duct measures 0.4 cm.  The gallbladder demonstrates a solitary mobile stone measuring 0.5 cm. No other secondary evidence to suggest acute cholecystitis.  Sonographic Thurston's sign is negative. The visualized portions of the pancreas appear normal. The spleen is normal and measures 8.7 x 3.5 cm. No ascites.                                Assessment/Plan:      * Sepsis    - With leukocytosis (>20), JACEY, and altered mentation.  - Leukocytosis resolving with WBC now resolving to 9.5.  - LP performed on 6/6; results unconvincing for meningitis - see acute encephalopathy   - With some left flank tenderness with UA suspicious for UTI on admission but repeat unremarkable -retroperitoneal ultrasound ordered to rule out pyelonephritis but unlikely.         Acute encephalopathy    - Likely multifactorial, including septic encephalopathy, uremic encephalopathy, and polypharmacy with opiates (hydrocodone/tramadol). On admission -    - With UA on admission suspicious for UTI with leukocytosis   - Uremia at 71 with JACEY.   - Presumptive positive for opiates (patient takes Norco at home).  -  Meningitis was considered but patient's mentation has resolved to baseline and without nuchal rigidity.  - Suspicious for seizure as well, although patient has no known seizure history  - Now resolved with IVF to treat JACEY/rhabdo and with sepsis therapy.  - Seizure precautions  - vEEG 24 hrs to r/u seizures, completed today.   - ID consulted and concern that patient with meningitis with mild nuchal rigidity, photophobia (now resolve) that put patient into seizure; suspicious that symptoms resolved with IV antiviral/antibiotics that were also treating meningitis. 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  - IR consult for fluoroscopy LP   - Opening pressure moderately elevated at 25 mm Hg.    - Glucose and protein normal at 65 and 26.    - WBC 1 and RBC significant (second aliquot) at 205.   - HSV pending, in consideration   - Yield of CSF unclear as patient has been broadly treated s/p 3 days; pending ID recommendations.     - ID recommends starting IV unasyn for Eubacterium limosum and d/c'ing other antibiotics and antivirals considering unremarkable tap and no meningeal irritation signs.    - Monitor for clinical decompensation.  - Consider consult to vascular neurology considering high RBC count in CSF - considerations for small SAH, AVM, or aneurysm.         Watery diarrhea    - Negative for C. Diff. Holding anti-motilities medications at this time.   - No significant abdominal pain or discomfort with abdominal US demonstrable for hepatomegaly, hepatic steatosis, and cholelithiasis.  - S/p one positive blood culture for Eubacterium limosum on 6/3 with no positive cultures since.   - Possibly Eubacterium enteritis as it is known for GI, urethral, pelvic, soft tissue infections but is rare.    - Now on IV unasyn.           Left nephrolithiasis    - Non-obstructing.  - Also with bilateral renal  cysts.  - Likely cause of patients mild left flank tenderness.  - No surgical intervention while hospitalized.  - Most recent UA without hematuria or UTI.         Elevated liver enzymes       6/6/2017 05:06 6/7/2017 06:18 6/8/2017 05:01   AST 42 (H) 100 (H) 102 (H)   ALT 44 99 (H) 120 (H)   - No abdominal/RUQ pain.  - Likely secondary to acyclovir or rocephin; both stopped per ID recommendations   - Daily CMP.         HTN (hypertension)    - Continue home lisinopril 10 mg PO daily now and atenolol 25 mg PO daily.           VTE Risk Mitigation         Ordered     enoxaparin injection 40 mg  Daily     Route:  Subcutaneous        06/08/17 1657     Medium Risk of VTE  Once      06/03/17 2147     Place sequential compression device  Until discontinued      06/03/17 2147        Disposition: No objective etiology for sepsis other than one blood culture for Eubacterium limosum. CSF likely inaccurate as patient was treated broadly for meningitis 3 days with IV antibiotics and antivirals. Per ID, will d/c other treatments and tailor with Unasyn IV and monitor for clinical decompensation. Otherwise, patient without leukocytosis, has remained afebrile and hemodynamically stable.     Anil Duke MD  PGY-1 Internal Medicine  [phone]    Department of Hospital Medicine   [facility]

## 2017-06-08 NOTE — ASSESSMENT & PLAN NOTE
- serum HSV 1/2 pcr (negative) and CMV IgM (WNL)  - CSF cell count WBC:1 , , Protein 26, Glucose 65. Cultures no growth, gram stain no WBC no organisms seen.  - Initial blood cultures 6/3 show EUBACTERIUM LIMOSUM which is often associated with GI illness (patient denies any diarrhea today)  -HSV CSF pending   - repeat blood cultures NGTD  - procalcitonin wnl    Plan:  1.Discontinue IV acyclovir, vancomycin,ceftriaxone, and ampicillin as CSF cell count and differential within normal limits, cultures negative to date and gram stain no growth.  2.Will start IV Unasyn for Eubacterium from 6/3.  3.Will continue to monitor for clinical decompensation  4.Stable non septic with resolved leukocytosis and no active signs of meningitis on exam    Discussed with ID staff. ID will follow with you.

## 2017-06-08 NOTE — ASSESSMENT & PLAN NOTE
- Negative for C. Diff. Holding anti-motilities medications at this time.   - No significant abdominal pain or discomfort with abdominal US demonstrable for hepatomegaly, hepatic steatosis, and cholelithiasis.  - S/p one positive blood culture for Eubacterium limosum on 6/3 with no positive cultures since.   - Possibly Eubacterium enteritis as it is known for GI, urethral, pelvic, soft tissue infections but is rare.    - Now on IV unasyn.

## 2017-06-08 NOTE — ASSESSMENT & PLAN NOTE
6/6/2017 05:06 6/7/2017 06:18 6/8/2017 05:01   AST 42 (H) 100 (H) 102 (H)   ALT 44 99 (H) 120 (H)   - No abdominal/RUQ pain.  - Likely secondary to acyclovir or rocephin; both stopped per ID recommendations   - Daily CMP.

## 2017-06-09 VITALS
HEART RATE: 77 BPM | DIASTOLIC BLOOD PRESSURE: 86 MMHG | WEIGHT: 192.88 LBS | HEIGHT: 60 IN | RESPIRATION RATE: 18 BRPM | OXYGEN SATURATION: 95 % | TEMPERATURE: 98 F | SYSTOLIC BLOOD PRESSURE: 171 MMHG | BODY MASS INDEX: 37.87 KG/M2

## 2017-06-09 LAB
ALBUMIN SERPL BCP-MCNC: 2.8 G/DL
ALP SERPL-CCNC: 105 U/L
ALT SERPL W/O P-5'-P-CCNC: 90 U/L
ANION GAP SERPL CALC-SCNC: 8 MMOL/L
AST SERPL-CCNC: 46 U/L
BASOPHILS # BLD AUTO: 0.03 K/UL
BASOPHILS NFR BLD: 0.2 %
BILIRUB SERPL-MCNC: 0.3 MG/DL
BUN SERPL-MCNC: 13 MG/DL
CALCIUM SERPL-MCNC: 8.9 MG/DL
CHLORIDE SERPL-SCNC: 107 MMOL/L
CO2 SERPL-SCNC: 27 MMOL/L
CREAT SERPL-MCNC: 0.8 MG/DL
DIFFERENTIAL METHOD: ABNORMAL
EOSINOPHIL # BLD AUTO: 0.3 K/UL
EOSINOPHIL NFR BLD: 2.7 %
ERYTHROCYTE [DISTWIDTH] IN BLOOD BY AUTOMATED COUNT: 12.9 %
EST. GFR  (AFRICAN AMERICAN): >60 ML/MIN/1.73 M^2
EST. GFR  (NON AFRICAN AMERICAN): >60 ML/MIN/1.73 M^2
GLUCOSE SERPL-MCNC: 98 MG/DL
HCT VFR BLD AUTO: 36.5 %
HGB BLD-MCNC: 12.3 G/DL
LYMPHOCYTES # BLD AUTO: 4.2 K/UL
LYMPHOCYTES NFR BLD: 33.6 %
MAGNESIUM SERPL-MCNC: 1.7 MG/DL
MCH RBC QN AUTO: 31.9 PG
MCHC RBC AUTO-ENTMCNC: 33.7 %
MCV RBC AUTO: 95 FL
MONOCYTES # BLD AUTO: 1.2 K/UL
MONOCYTES NFR BLD: 9.9 %
NEUTROPHILS # BLD AUTO: 6.4 K/UL
NEUTROPHILS NFR BLD: 52 %
PLATELET # BLD AUTO: 154 K/UL
PMV BLD AUTO: 9.6 FL
POTASSIUM SERPL-SCNC: 4.3 MMOL/L
PROT SERPL-MCNC: 5.7 G/DL
RBC # BLD AUTO: 3.86 M/UL
SODIUM SERPL-SCNC: 142 MMOL/L
WBC # BLD AUTO: 12.37 K/UL

## 2017-06-09 PROCEDURE — 99233 SBSQ HOSP IP/OBS HIGH 50: CPT | Mod: ,,, | Performed by: PHYSICIAN ASSISTANT

## 2017-06-09 PROCEDURE — 25000003 PHARM REV CODE 250: Performed by: HOSPITALIST

## 2017-06-09 PROCEDURE — 83735 ASSAY OF MAGNESIUM: CPT

## 2017-06-09 PROCEDURE — 80053 COMPREHEN METABOLIC PANEL: CPT

## 2017-06-09 PROCEDURE — 36415 COLL VENOUS BLD VENIPUNCTURE: CPT

## 2017-06-09 PROCEDURE — 63600175 PHARM REV CODE 636 W HCPCS: Performed by: PHYSICIAN ASSISTANT

## 2017-06-09 PROCEDURE — 25000003 PHARM REV CODE 250: Performed by: PHYSICIAN ASSISTANT

## 2017-06-09 PROCEDURE — 25000003 PHARM REV CODE 250: Performed by: STUDENT IN AN ORGANIZED HEALTH CARE EDUCATION/TRAINING PROGRAM

## 2017-06-09 PROCEDURE — 85025 COMPLETE CBC W/AUTO DIFF WBC: CPT

## 2017-06-09 PROCEDURE — 63600175 PHARM REV CODE 636 W HCPCS: Performed by: STUDENT IN AN ORGANIZED HEALTH CARE EDUCATION/TRAINING PROGRAM

## 2017-06-09 RX ORDER — DULOXETIN HYDROCHLORIDE 60 MG/1
60 CAPSULE, DELAYED RELEASE ORAL DAILY
Qty: 30 CAPSULE | Refills: 4 | Status: SHIPPED | OUTPATIENT
Start: 2017-06-09 | End: 2017-06-09

## 2017-06-09 RX ORDER — DULOXETIN HYDROCHLORIDE 60 MG/1
60 CAPSULE, DELAYED RELEASE ORAL DAILY
Qty: 30 CAPSULE | Refills: 4
Start: 2017-06-09 | End: 2018-06-09

## 2017-06-09 RX ORDER — GABAPENTIN 300 MG/1
600 CAPSULE ORAL 3 TIMES DAILY
Qty: 80 CAPSULE | Refills: 4
Start: 2017-06-09 | End: 2018-06-09

## 2017-06-09 RX ORDER — GABAPENTIN 300 MG/1
600 CAPSULE ORAL 3 TIMES DAILY
Qty: 80 CAPSULE | Refills: 4 | Status: SHIPPED | OUTPATIENT
Start: 2017-06-09 | End: 2017-06-09

## 2017-06-09 RX ORDER — MOXIFLOXACIN HYDROCHLORIDE 400 MG/1
400 TABLET ORAL DAILY
Qty: 10 TABLET | Refills: 0 | Status: SHIPPED | OUTPATIENT
Start: 2017-06-10 | End: 2017-06-19

## 2017-06-09 RX ORDER — MOXIFLOXACIN HYDROCHLORIDE 400 MG/1
400 TABLET ORAL DAILY
Qty: 10 TABLET | Refills: 0 | Status: SHIPPED | OUTPATIENT
Start: 2017-06-09 | End: 2017-06-09

## 2017-06-09 RX ORDER — TRAZODONE HYDROCHLORIDE 150 MG/1
150 TABLET ORAL NIGHTLY
Qty: 30 TABLET | Refills: 0
Start: 2017-06-09 | End: 2017-06-09

## 2017-06-09 RX ORDER — TRAZODONE HYDROCHLORIDE 150 MG/1
150 TABLET ORAL NIGHTLY
Qty: 30 TABLET | Refills: 0 | Status: SHIPPED | OUTPATIENT
Start: 2017-06-09 | End: 2017-06-09

## 2017-06-09 RX ORDER — MAGNESIUM SULFATE HEPTAHYDRATE 40 MG/ML
2 INJECTION, SOLUTION INTRAVENOUS ONCE
Status: COMPLETED | OUTPATIENT
Start: 2017-06-09 | End: 2017-06-09

## 2017-06-09 RX ORDER — TRAZODONE HYDROCHLORIDE 150 MG/1
150 TABLET ORAL NIGHTLY
Qty: 30 TABLET | Refills: 0
Start: 2017-06-09 | End: 2017-07-09

## 2017-06-09 RX ADMIN — TRAMADOL HYDROCHLORIDE 50 MG: 50 TABLET, COATED ORAL at 09:06

## 2017-06-09 RX ADMIN — GABAPENTIN 600 MG: 300 CAPSULE ORAL at 04:06

## 2017-06-09 RX ADMIN — AMPICILLIN SODIUM AND SULBACTAM SODIUM 3 G: 2; 1 INJECTION, POWDER, FOR SOLUTION INTRAMUSCULAR; INTRAVENOUS at 11:06

## 2017-06-09 RX ADMIN — GABAPENTIN 600 MG: 300 CAPSULE ORAL at 06:06

## 2017-06-09 RX ADMIN — TRAMADOL HYDROCHLORIDE 50 MG: 50 TABLET, COATED ORAL at 04:06

## 2017-06-09 RX ADMIN — MAGNESIUM SULFATE IN WATER 2 G: 40 INJECTION, SOLUTION INTRAVENOUS at 11:06

## 2017-06-09 RX ADMIN — ATENOLOL 25 MG: 25 TABLET ORAL at 09:06

## 2017-06-09 RX ADMIN — AMPICILLIN SODIUM AND SULBACTAM SODIUM 3 G: 2; 1 INJECTION, POWDER, FOR SOLUTION INTRAMUSCULAR; INTRAVENOUS at 05:06

## 2017-06-09 RX ADMIN — DULOXETINE 60 MG: 60 CAPSULE, DELAYED RELEASE ORAL at 09:06

## 2017-06-09 RX ADMIN — LISINOPRIL 10 MG: 10 TABLET ORAL at 09:06

## 2017-06-09 NOTE — ASSESSMENT & PLAN NOTE
- serum HSV 1/2 pcr (negative) and CMV IgM (WNL)  - CSF cell count WBC:1 , , Protein 26, Glucose 65. Cultures no growth, gram stain no WBC no organisms seen.  - Initial blood cultures 6/3 show EUBACTERIUM LIMOSUM which is often associated with GI illness (patient denies any diarrhea today)  -HSV CSF negative   - repeat blood cultures NGTD  - procalcitonin wnl    Plan:  2.Discontinue IV unasyn, start Moxifloxacin 400 mg PO daily. Treatment needed for 2 weeks from date of first negative blood culture for uncomplicated bacteremia. 7/19/17  3.will have patient follow up in ID clinic 10-14 days from discharge.   4.Stable, non septic on examination.     Discussed with ID staff. Discussed with primary team.

## 2017-06-09 NOTE — SUBJECTIVE & OBJECTIVE
Interval History:   NAEON. Denies having any fever or chills. Denies having any diarrhea. Headaches much improved. No complaints at this time. Desires to go home.     Review of Systems   Constitutional: Negative for activity change, chills, diaphoresis and fever.   HENT: Negative for mouth sores.    Respiratory: Negative for cough, shortness of breath and wheezing.    Cardiovascular: Negative for chest pain.   Gastrointestinal: Negative for abdominal pain, constipation, diarrhea, nausea and vomiting.   Genitourinary: Negative for dysuria, frequency and urgency.   Musculoskeletal: Negative for back pain and neck pain.   Neurological: Negative for dizziness and headaches.   Hematological: Does not bruise/bleed easily.     Objective:     Vital Signs (Most Recent):  Temp: 98.7 °F (37.1 °C) (06/09/17 1246)  Pulse: 70 (06/09/17 1246)  Resp: 18 (06/09/17 1246)  BP: (!) 146/83 (06/09/17 1246)  SpO2: 95 % (06/09/17 1246) Vital Signs (24h Range):  Temp:  [98 °F (36.7 °C)-99.3 °F (37.4 °C)] 98.7 °F (37.1 °C)  Pulse:  [70-93] 70  Resp:  [18] 18  SpO2:  [94 %-97 %] 95 %  BP: (113-184)/(55-97) 146/83     Weight: 87.5 kg (192 lb 14.4 oz)  Body mass index is 37.67 kg/m².    Estimated Creatinine Clearance: 77.2 mL/min (based on Cr of 0.8).    Physical Exam   Constitutional: She is oriented to person, place, and time. She appears well-developed and well-nourished. No distress.   HENT:   Head: Normocephalic.   Eyes: Pupils are equal, round, and reactive to light.   Neck: Normal range of motion.   Cardiovascular: Normal rate, regular rhythm and normal heart sounds.  Exam reveals no gallop and no friction rub.    No murmur heard.  Pulmonary/Chest: Effort normal. No respiratory distress. She has no wheezes. She has no rales.   Abdominal: Soft. She exhibits no distension.   Musculoskeletal: Normal range of motion.   Neurological: She is alert and oriented to person, place, and time.   Skin: Skin is warm. She is not diaphoretic. No  erythema. No pallor.   Psychiatric: She has a normal mood and affect.   Nursing note and vitals reviewed.      Significant Labs:   Blood Culture:   Recent Labs  Lab 06/03/17  1219 06/05/17  1148   LABBLOO No growth after 5 days.  Gram stain veronika bottle: Gram positive rods   Results called to and read back by:Arely Jorgensen RN 06/05/2017  04:16  EUBACTERIUM LIMOSUM No Growth to date  No Growth to date  No Growth to date  No Growth to date  No Growth to date  No Growth to date  No Growth to date  No Growth to date     CBC:   Recent Labs  Lab 06/08/17  0501 06/09/17  0400   WBC 9.45 12.37   HGB 12.5 12.3   HCT 36.6* 36.5*   * 154     CMP:   Recent Labs  Lab 06/08/17  0501 06/08/17  0732 06/09/17  0400     --  142   K 3.6 3.4* 4.3     --  107   CO2 25  --  27     --  98   BUN 12  --  13   CREATININE 0.7  --  0.8   CALCIUM 8.5*  --  8.9   PROT 5.9*  --  5.7*   ALBUMIN 2.8*  --  2.8*   BILITOT 0.4  --  0.3   ALKPHOS 113  --  105   *  --  46*   *  --  90*   ANIONGAP 9  --  8   EGFRNONAA >60.0  --  >60.0     CSF:   Recent Labs  Lab 06/07/17  1514   CSFCULTURE No Growth to date       Significant Imaging: I have reviewed all pertinent imaging results/findings within the past 24 hours.

## 2017-06-09 NOTE — PROGRESS NOTES
"[facility]  Infectious Disease  Progress Note    Patient Name: Lien Sotelo  MRN: [id number]  Admission Date: 6/3/2017  Length of Stay: 6 days  Attending Physician: Karuna Kauffman MD  Primary Care Provider: Primary Doctor No    Isolation Status: No active isolations  Assessment/Plan:      * Sepsis    - serum HSV 1/2 pcr (negative) and CMV IgM (WNL)  - CSF cell count WBC:1 , , Protein 26, Glucose 65. Cultures no growth, gram stain no WBC no organisms seen.  - Initial blood cultures 6/3 show EUBACTERIUM LIMOSUM which is often associated with GI illness (patient denies any diarrhea today)  -HSV CSF negative   - repeat blood cultures NGTD  - procalcitonin wnl    Plan:  2.Discontinue IV unasyn, start Moxifloxacin 400 mg PO daily. Treatment needed for 2 weeks from date of first negative blood culture for uncomplicated bacteremia. 7/19/17  3.will have patient follow up in ID clinic 10-14 days from discharge.   4.Stable, non septic on examination.     Discussed with ID staff. Discussed with primary team.             Thank you for your consult. I will sign off. Please contact us if you have any additional questions.    Shawnee Bishop PA-C  Infectious Disease  [facility] [phone]    Subjective:     Principal Problem:Sepsis    HPI: 55 y/o F with hx of HTN (on lisinopril and atenolol ) , smoker, and with chronic back pain (s/p 3 surgeries, on opioids for pain) [place]  [facility] [place] [facility]. Patient was brought to [facility] ED after she was found confused and obtunded. Daughter states that patient had vomitus on her. She also notes jerking motions that have been concerning her for seizures. There is concern that pt may have taken opioids, although pt denied. Patient states that she likely hit the back of her head as she is having occipital HA and a "bump". She also notes dry/ cracked lips (possibily of biting her tongue). Pt also states she has been " having a cough for the past 3 weeks and has been having diarrhea. She interacts with a dog and days she has received multiple scratches on her arms.  She reports dog has had all its shots.  Last time pt was seen within her normal states of health was on 6/2 around 5 pm, on 6/3 around 10AM she was found on the sofa and altered. The day prior to that pt was riding her bicycle (less than 1 mi distance). Pt denies any hx of seizures, recent f/v, abd pain, dysuria, paresthesias, neuro deficits, CP or SOB, sick contacts, or different eating habits.    At [facility] ED, patient was found to be hypotensive/ hypovolemic, in JACEY with Cr of 3.5, K+ of 7.1, bicarb of 18, CPK of 1240. WBC Of 21K. CT head was unremarkable. UA with evidence of UTI (WBC 40, moderate bacteria but no growth on cultures). She received IV rocephin and 4L NS with improvement in her encephalopathy. Her K improved from 7.1 --> 6.1 after shifting with insulin and albuterol. And her Cr improved from 3.5 --> 2.5 with IVF. [place] [facility] communicating appropriately, with no neuro deficits.     Currently she complains of global HA, photophobia, feels cold.  She has nausea and diarrhea.  She has neck and back pain that is at he baseline.    Interval History:   NAEON. Denies having any fever or chills. Denies having any diarrhea. Headaches much improved. No complaints at this time. Desires to go home.     Review of Systems   Constitutional: Negative for activity change, chills, diaphoresis and fever.   HENT: Negative for mouth sores.    Respiratory: Negative for cough, shortness of breath and wheezing.    Cardiovascular: Negative for chest pain.   Gastrointestinal: Negative for abdominal pain, constipation, diarrhea, nausea and vomiting.   Genitourinary: Negative for dysuria, frequency and urgency.   Musculoskeletal: Negative for back pain and neck pain.   Neurological: Negative for dizziness and headaches.   Hematological: Does not bruise/bleed  easily.     Objective:     Vital Signs (Most Recent):  Temp: 98.7 °F (37.1 °C) (06/09/17 1246)  Pulse: 70 (06/09/17 1246)  Resp: 18 (06/09/17 1246)  BP: (!) 146/83 (06/09/17 1246)  SpO2: 95 % (06/09/17 1246) Vital Signs (24h Range):  Temp:  [98 °F (36.7 °C)-99.3 °F (37.4 °C)] 98.7 °F (37.1 °C)  Pulse:  [70-93] 70  Resp:  [18] 18  SpO2:  [94 %-97 %] 95 %  BP: (113-184)/(55-97) 146/83     Weight: 87.5 kg (192 lb 14.4 oz)  Body mass index is 37.67 kg/m².    Estimated Creatinine Clearance: 77.2 mL/min (based on Cr of 0.8).    Physical Exam   Constitutional: She is oriented to person, place, and time. She appears well-developed and well-nourished. No distress.   HENT:   Head: Normocephalic.   Eyes: Pupils are equal, round, and reactive to light.   Neck: Normal range of motion.   Cardiovascular: Normal rate, regular rhythm and normal heart sounds.  Exam reveals no gallop and no friction rub.    No murmur heard.  Pulmonary/Chest: Effort normal. No respiratory distress. She has no wheezes. She has no rales.   Abdominal: Soft. She exhibits no distension.   Musculoskeletal: Normal range of motion.   Neurological: She is alert and oriented to person, place, and time.   Skin: Skin is warm. She is not diaphoretic. No erythema. No pallor.   Psychiatric: She has a normal mood and affect.   Nursing note and vitals reviewed.      Significant Labs:   Blood Culture:   Recent Labs  Lab 06/03/17  1219 06/05/17  1148   LABBLOO No growth after 5 days.  Gram stain veronika bottle: Gram positive rods   Results called to and read back by:Arely Jorgensen RN 06/05/2017  04:16  EUBACTERIUM LIMOSUM No Growth to date  No Growth to date  No Growth to date  No Growth to date  No Growth to date  No Growth to date  No Growth to date  No Growth to date     CBC:   Recent Labs  Lab 06/08/17  0501 06/09/17  0400   WBC 9.45 12.37   HGB 12.5 12.3   HCT 36.6* 36.5*   * 154     CMP:   Recent Labs  Lab 06/08/17  0501 06/08/17  0732 06/09/17  0400      --  142   K 3.6 3.4* 4.3     --  107   CO2 25  --  27     --  98   BUN 12  --  13   CREATININE 0.7  --  0.8   CALCIUM 8.5*  --  8.9   PROT 5.9*  --  5.7*   ALBUMIN 2.8*  --  2.8*   BILITOT 0.4  --  0.3   ALKPHOS 113  --  105   *  --  46*   *  --  90*   ANIONGAP 9  --  8   EGFRNONAA >60.0  --  >60.0     CSF:   Recent Labs  Lab 06/07/17  1514   CSFCULTURE No Growth to date       Significant Imaging: I have reviewed all pertinent imaging results/findings within the past 24 hours.

## 2017-06-09 NOTE — DISCHARGE INSTRUCTIONS
We believe your symptoms are likely secondary to a bacteria in your bloodstream called Eubacteria limosum; it can cause diarrhea and abdominal infections. This was found on a blood culture on your admission. Subsequent cultures in your blood, urine, and brain/spinal fluid have been negative for growth.    We believe we have been adequately treating you with antibiotics here during your stay, but to clear the infection we have written a prescription for an antibiotic called moxifloxacin to be taken for an additional 10 days (14 days from your first negative blood culture on 6/4). Take one tablet daily until 6/19/17. I have sent it to a pharmacy in [place] where you will be staying with your sister, but I have also printed it in case that is your preference to bring it into a pharmacy.     Otherwise, please return to an ED if you experience and altered mentation, fevers, chills, nausea, vomiting.    You have a follow-up appointment with our infectious disease specialists on 6/28. Details for the visit are on your after visit summary. It was a pleasure to meet you.

## 2017-06-10 LAB
BACTERIA BLD CULT: NORMAL
BACTERIA BLD CULT: NORMAL

## 2017-06-10 NOTE — PROGRESS NOTES
AVS d/c instructions given to patient, voices understanding. PIV d/c with cath intact, gauze dressing applied. Waiting for family ride.

## 2017-06-10 NOTE — DISCHARGE SUMMARY
"DISCHARGE SUMMARY  Hospital Medicine    Team: [facility] [place] [facility] HOSP MED 3    Patient Name: Lien Sotelo  YOB: 1960    Admit Date: 6/3/2017    Discharge Date: 06/09/2017    Discharge Attending Physician: Karuna Kauffman MD     Diagnoses:  Active Hospital Problems    Diagnosis  POA    *Sepsis [A41.9]  Yes     Priority: 1 - High    Acute encephalopathy [G93.40]  Yes     Priority: 2     Watery diarrhea [R19.7]  Yes     Priority: 6     Elevated liver enzymes [R74.8]  Yes    Left nephrolithiasis [N20.0]  Yes    HTN (hypertension) [I10]  Yes      Resolved Hospital Problems    Diagnosis Date Resolved POA    JACEY (acute kidney injury) [N17.9] 06/07/2017 Yes     Priority: 3     Metabolic acidosis, normal anion gap (NAG) [E87.2] 06/06/2017 Yes     Priority: 4     Rhabdomyolysis [M62.82] 06/07/2017 Yes     Priority: 5     Hyperkalemia [E87.5] 06/06/2017 Yes     Review of Systems   Constitutional: Negative for activity change, appetite change, chills, diaphoresis, fatigue, fever and unexpected weight change.   HENT: Positive for mouth sores (Tongue apthous ulcers ).    Eyes: Negative for photophobia, pain, redness and visual disturbance.   Respiratory: Negative for cough and shortness of breath.    Cardiovascular: Negative for chest pain.   Gastrointestinal: Positive for diarrhea. Negative for abdominal pain, nausea and vomiting.   Endocrine: Negative for cold intolerance and heat intolerance.   Genitourinary: Negative for dysuria.   Musculoskeletal: Positive for back pain. Negative for arthralgias and myalgias.   Skin: Negative for rash and wound.   Neurological:  Negative headaches for dizziness, weakness and numbness.   Psychiatric/Behavioral: Negative for confusion.      Physical Exam   Constitutional: She is oriented to person, place, and time. She appears well-developed and well-nourished. No distress.   HENT:   2-3 "knot", tender to palpation in occipital region    Eyes: Conjunctivae and EOM are normal. " "Pupils are equal, round, and reactive to light. No scleral icterus.   Neck: Normal range of motion. Neck supple.   Cardiovascular: Regular rhythm, normal heart sounds and intact distal pulses.    Pulmonary/Chest: Effort normal and breath sounds normal.   Abdominal: Soft. Bowel sounds are normal.   Musculoskeletal: Normal range of motion. She exhibits no edema or tenderness.   Neurological: She is alert and oriented to person, place, and time. She has normal reflexes.   Skin: She is not diaphoretic.   Psychiatric: She has a normal mood and affect. Her behavior is normal. Judgment and thought content normal.     Discharged Condition: admit problems have stabilized       HOSPITAL COURSE:    Initial Presentation:  55 y/o F with hx of HTN (on lisinopril and atenolol) ,smoker, and with chronic back pain (s/p 3 surgeries, on opioids for pain) presents to  [facility] [place] from [facility] ED. Patient was brought to [facility] ED after she was found confused and obtunded on her sofa at home. Daughter states that patient may have vomited. She also notes jerking motions that have been concerning her for seizures. There is concern that pt may have taken opioids, although pt denies. Patient states that she likely hit the back of her head as she is having occipital HA and a "bump". + blurred vision. She also notes dry/ cracked lips (possibily of biting her tongue). Pt also states she has been having a cough for the past few days. Last time pt was seen within her normal states of health was on 6/2 around 5 pm, on 6/3 around 10AM she was found on the sofa and altered. The day prior to that pt was riding her bicycle (less than 1 mi distance). Pt denies any hx of seizures, recent f/v, abd pain, dysuria, paresthesias, neuro deficits, CP or SOB    At [facility] ED, patient was found to be hypotensive/ hypovolemic, in JACEY with Cr of 3.5, K+ of 7.1, bicarb of 18, CPK of 1240. WBC Of 21K. CT head was unremarkable. UA with evidence of UTI. " She received IV rocephin and 4L NS with improvement in her encephalopathy. Her K improved from 7.1 --> 6.1 after shifting with insulin and albuterol. And her Cr improved from 3.5 --> 2.5 with IVF. Creatinine was back to normal by the next morning. On admission at [facility] [place], pt was communicating appropriately, with no neuro deficits.     Course of Principle Problem for Admission:  Patient was admitted to hospital medicine 3. IVF were stopped and patient's mentation was normal by visit of the next morning. ID was consulted to treat patient and was suspicious for patient having meningitis considering possible unwitnessed seizure episode (tongue biting) and photophobia and headache on physical examination. 24 hour EEG during hospital stay was normal without epileptiform changes. She was treated empirically with IV vancomycin, ceftriaxone, ampicillin (1BCx + on 6/3 for gram positive miranda), and acyclovir). During the day patient began endorsing watery diarrheal events that were reported to be lightly bloody. Upon examination of the sample, appeared to look watery with pinkish tinge as light hematuria. Suspicious that patient may have voided urine during bowel events, although patient denied this occurring, as appearance could very well be urine secondary to rhabdomyolysis. H/H was checked and was found to be stable and remained that way.  Patient also complained of mild flank tenderness that resolved during during the hospital course. UA on admission was concerning for UTI but subsequent UA was clean. Retroperitoneal US found left non-obstructing nephrolithiasis contributing to patient's pain. Blood cultures from 6/4 were positive for gram positive rods. LP on 6/7 found glucose of 65 and protein of 26. RBC were elevated at 205 with WBC of 1. CSF cultures NGTD. Patient remained clinically stable during her hospital course. Blood culture on admission grew positive on 6/3 (one culture) for Eubacterium limosum. Per ID  recommendations, antibiotics were de-escalated to PO moxifloxacin on 6/9 with a total 14 day course from last negative blood culture (last day 6/19) for treatment of Eubacterium limosum. Patient was discharged in stable condition with follow-up with ID on 6/28.       CONSULTS:   - Infectious Disease    Other Pertinent Lab Findings:       6d ago   Blood Culture, Routine  Gram stain veronika bottle: Gram positive rods    Blood Culture, Routine  Results called to and read back by:Arely Jorgensen RN 06/05/2017  04:16   Blood Culture, Routine  EUBACTERIUM LIMOSUM   Resulting Agency [facility]      Specimen Collected: 06/03/17 12:19 Last Resulted: 06/07/17 13:39             6/3/2017 11:27 6/3/2017 22:08 6/4/2017 04:07 6/4/2017 17:53 6/5/2017 04:20 6/6/2017 05:06 6/7/2017 06:18 6/8/2017 05:01 6/9/2017 04:00   WBC 21.55 (H) 22.76 (H) 22.18 (H) 21.05 (H) 18.52 (H) 14.38 (H) 10.18 9.45 12.37        6/7/2017 15:13   Color, CSF Colorless   Heme [place] 1.0   Appearance, CSF Clear   WBC, CSF 2   RBC,  (A)   Segmented Neutrophils, CSF 53 (H)   Lymphs, CSF 22 (L)   Mono/Macrophage, CSF 25       Pertinent/Significant Diagnostic Studies:    Imaging Results          US Retroperitoneal Complete (Final result)  Result time 06/08/17 02:02:03    Final result by Karla Powers MD (06/08/17 02:02:03)                 Impression:        Nonobstructing left nephrolithiasis.    Bilateral renal cysts.  ______________________________________     Electronically signed by resident: NATASHA DANIELS MD  Date:     06/08/17  Time:    01:57            As the supervising and teaching physician, I personally reviewed the images and resident's interpretation and I agree with the findings.          Electronically signed by: KARLA POWERS MD  Date:     06/08/17  Time:    02:02              Narrative:    Time of Procedure: 06/07/17 21:18:00  Accession # [id number]    Reason for study: Bilateral flank tenderness, left greater than right, concern for  pyelonephritis     Comparison: None    Technique: Routine renal ultrasound was performed.    Findings: The kidneys are normal in size, measuring 11.2 cm on the right and 10.5 cm on the left. There is good corticomedullary differentiation and normal cortical thickness. The upper pole of the right kidney demonstrates a 1.1 cm cyst. The left kidney demonstrates 2 cysts, the larger of which measures 3.1 cm in the lower pole. There are 2 echogenic foci within the left kidney which likely represent calculi. No solid renal masses or hydronephrosis. Perfusion to the kidneys is normal. Resistive indices are normal and as follow: 0.59 on the right and 0.68 on the left. The urinary bladder appears unremarkable with bilateral ureteral joints demonstrated.                             FL Lumbar Puncture (xpd) (Preliminary result)  Result time 06/07/17 15:49:55    Preliminary result by Alan Johns MD (06/07/17 15:49:55)                 Impression:      Successful lumbar puncture under fluoroscopic guidance as detailed above.  ______________________________________     Electronically signed by resident: ALAN JOHNS MD  Date:     06/07/17  Time:    15:49            As the supervising and teaching physician, I personally reviewed the images and resident's interpretation and I agree with the findings.               Narrative:    [id number]  06/07/17  14:39:12 [id number] ([facility]) : FL LUMBAR PUNCTURE    ADDITIONAL CLINICAL HISTORY: History of encephalopathy.  Rule out meningitis.    TECHNIQUE:   Informed written consent was obtained from the patient.  The risks, benefits, and alternatives to the exam were discussed.  The patient was placed in the prone position on the fluoroscopy table and was prepped and draped in a sterile fashion.  Local anesthesia was achieved using 1% lidocaine solution.  The right L3-4 interspace was localized and a 22-gauge spinal needle and stylet were advanced into the thecal sac under fluoroscopic guidance.    Total  fluoroscopy time 0.5 minutes.    FINDINGS:  Approximately 7 cc of clear cerebrospinal fluid was obtained and sent to the laboratory with orders per referring physician.  The opening and closing pressures were 24 and 20 cm H2O respectively.  The stylet was replaced and the spinal needle was removed.  There were no immediate post procedure complications.                             X-Ray Chest PA And Lateral (Final result)  Result time 06/04/17 12:31:39    Final result by Antonino Whaley III, MD (06/04/17 12:31:39)                 Impression:     No acute process seen.      Electronically signed by: ANTONINO WHALEY  Date:     06/04/17  Time:    12:31              Narrative:    2 views: Heart size is normal.  Lungs are clear.  Bones reveal DJD.                             US Abdomen Limited (Final result)  Result time 06/04/17 09:04:33    Final result by Ivett Sousa MD (06/04/17 09:04:33)                 Impression:        Cholelithiasis.    Hepatomegaly and hepatic steatosis.    ______________________________________     Electronically signed by resident: IRINA HERNANDEZ MD  Date:     06/04/17  Time:    08:18            As the supervising and teaching physician, I personally reviewed the images and resident's interpretation and I agree with the findings.          Electronically signed by: Ivett Sousa  Date:     06/04/17  Time:    09:04              Narrative:    Time of Procedure: 06/04/17 07:08:00  Accession # [id number]    Reason for study: Rule out cholecystitis    Comparison: None.    Technique: Limited right upper quadrant ultrasound was performed.    Findings: The liver is enlarged measuring 19.2 cm extending below the costal margin demonstrating findings most suggestive of hepatic steatosis.  No focal hepatic parenchymal abnormality. No intra- or extrahepatic biliary ductal dilatation. The common bile duct measures 0.4 cm.  The gallbladder demonstrates a solitary mobile stone measuring 0.5 cm. No other  secondary evidence to suggest acute cholecystitis.  Sonographic Thurston's sign is negative. The visualized portions of the pancreas appear normal. The spleen is normal and measures 8.7 x 3.5 cm. No ascites.                                Special Treatments/Procedures:   LP (6/7/2017)    Disposition:  Home       Future Scheduled Appointments:  Future Appointments  Date Time Provider Department Center   6/28/2017 1:30 PM Shawnee Bishop PA-C [facility] ID Javan Banks       Follow-up Plans from This Hospitalization:  We believe your symptoms are likely secondary to a bacteria in your bloodstream called Eubacteria limosum; it can cause diarrhea and abdominal infections. This was found on a blood culture on your admission. Subsequent cultures in your blood, urine, and brain/spinal fluid have been negative for growth.    We believe we have been adequately treating you with antibiotics here during your stay, but to clear the infection we have written a prescription for an antibiotic called moxifloxacin to be taken for an additional 10 days (14 days from your first negative blood culture on 6/4). Take one tablet daily until 6/19/17. I have sent it to a pharmacy in [place] where you will be staying with your sister, but I have also printed it in case that is your preference to bring it into a pharmacy.     Otherwise, please return to an ED if you experience and altered mentation, fevers, chills, nausea, vomiting.    You have a follow-up appointment with our infectious disease specialists on 6/28. Details for the visit are on your after visit summary. It was a pleasure to meet you.     Last CBC/BMP/HgbA1c (if applicable):  Recent Results (from the past 336 hour(s))   CBC with Automated Differential    Collection Time: 06/09/17  4:00 AM   Result Value Ref Range    WBC 12.37 3.90 - 12.70 K/uL    Hemoglobin 12.3 12.0 - 16.0 g/dL    Hematocrit 36.5 (L) 37.0 - 48.5 %    Platelets 154 150 - 350 K/uL   CBC with Automated Differential     Collection Time: 06/08/17  5:01 AM   Result Value Ref Range    WBC 9.45 3.90 - 12.70 K/uL    Hemoglobin 12.5 12.0 - 16.0 g/dL    Hematocrit 36.6 (L) 37.0 - 48.5 %    Platelets 142 (L) 150 - 350 K/uL   CBC with Automated Differential    Collection Time: 06/07/17  6:18 AM   Result Value Ref Range    WBC 10.18 3.90 - 12.70 K/uL    Hemoglobin 12.4 12.0 - 16.0 g/dL    Hematocrit 37.1 37.0 - 48.5 %    Platelets 138 (L) 150 - 350 K/uL     Recent Results (from the past 336 hour(s))   Basic metabolic panel    Collection Time: 06/03/17  1:48 PM   Result Value Ref Range    Sodium 142 136 - 145 mmol/L    Potassium 6.1 (H) 3.5 - 5.1 mmol/L    Chloride 112 (H) 95 - 110 mmol/L    CO2 18 (L) 23 - 29 mmol/L    BUN, Bld 63 (H) 6 - 20 mg/dL    Creatinine 2.5 (H) 0.5 - 1.4 mg/dL    Calcium 8.4 (L) 8.7 - 10.5 mg/dL    Anion Gap 12 8 - 16 mmol/L     No results found for: HGBA1C    Discharge Medication List:     Medication List      START taking these medications    duloxetine 60 MG capsule  Commonly known as:  CYMBALTA  Take 1 capsule (60 mg total) by mouth once daily.     gabapentin 300 MG capsule  Commonly known as:  NEURONTIN  Take 2 capsules (600 mg total) by mouth 3 (three) times daily.     moxifloxacin 400 mg tablet  Commonly known as:  AVELOX  Take 1 tablet (400 mg total) by mouth once daily.  Start taking on:  6/10/2017     trazodone 150 MG tablet  Commonly known as:  DESYREL  Take 1 tablet (150 mg total) by mouth nightly.        CONTINUE taking these medications    atenolol 25 MG tablet  Commonly known as:  TENORMIN     hydrocodone-acetaminophen 10-325mg  mg Tab  Commonly known as:  NORCO     lisinopril 10 MG tablet           Where to Get Your Medications      These medications were sent to [facility]/pharmacy #[id number] [place], [place] - [address]  [address] [address]    Phone:  [phone]    moxifloxacin 400 mg tablet     Information about where to get these medications is not yet available     Ask your nurse or doctor about these medications   duloxetine 60 MG capsule   gabapentin 300 MG capsule   trazodone 150 MG tablet         Patient Instructions:    Discharge Procedure Orders  Diet general     Call MD for:  temperature >100.4     Call MD for:  severe persistent headache     Call MD for:  difficulty breathing or increased cough     Call MD for:  persistent dizziness, light-headedness, or visual disturbances     Call MD for:  increased confusion or weakness         Signing Physician:    Anil Duke MD  PGY-1 Internal Medicine  [phone]

## 2017-06-12 LAB
CMV SPEC QL SHELL VIAL CULT: NO GROWTH
GRAM STN SPEC: NORMAL

## 2017-06-12 NOTE — PROCEDURES
DATE OF PROCEDURE:  06/07/2017    EEG NUMBER:  [id number]    REFERRING PHYSICIAN:  Dr. Cardenas.    This EEG was performed to assess for the patient's events.    ICU EEG/VIDEO MONITORING REPORT    METHODOLOGY:  Electroencephalographic (EEG) is recorded with electrodes placed   according to the International 10-20 placement system.  Thirty two (32) channels   of digital signal (sampling rate of 512/sec), including T1 and T2, were   simultaneously recorded from the scalp and may include EKG, EMG, and/or eye   monitors.  Recording band pass was 0.1 to 512 Hz.  Digital video recording of   the patient is simultaneously recorded with the EEG.  The patient is instructed   to report clinical symptoms which may occur during the recording session.  EEG   and video recording are stored and archived in digital format.  Activation   procedures, which include photic stimulation, hyperventilation and instructing   patients to perform simple tasks, are done in selected patients.    The EEG is displayed on a monitor screen and can be reviewed using different   montages.  Computer-assisted analysis is employed to detect spike and   electrographic seizure activity.  The entire record is submitted for computer   analysis.  The entire recording is visually reviewed, and the times identified   by computer analysis as being spikes or seizures are reviewed again.    Compressed spectral analysis (CSA) is also performed on the activity recorded   from each individual channel.  This is displayed as a power display of   frequencies from 0 to 30 Hz over time.  The CSA is reviewed looking for   asymmetries in power between homologous areas of the scalp, then compared with   the original EEG recording.    [employer] software was also utilized in the review of this study.  This software   suite analyzes the EEG recording in multiple domains.  Coherence and rhythmicity   are computed to identify EEG sections which may contain organized seizures.     Each channel undergoes analysis to detect the presence of spike and sharp waves   which have special and morphological characteristics of epileptic activity.  The   routine EEG recording is converted from special into frequency domain.  This is   then displayed comparing homologous areas to identify areas of significant   asymmetry.  Algorithm to identify non-cortically generated artifact is used to   separate artifact from the EEG.    RECORDING TIMES:  Start on 06/07/2017 at hour 7 minute 0 second 14  Stop on 06/07/2017 at hour 8 minute 14 second 58  Restart on 06/07/2017 at hour 9 minute 17 second 59.  Stop on 06/07/2017 at hour 13, minute 31, second 24.  Total time of video EEG recording was 5 hours and 28 minutes.    EEG FINDINGS:  The recording was obtained with a number of standard bipolar and   referential montages during wakefulness and drowsiness.  In the alert state, the   posterior background rhythm was a symmetric, well-modulated 11 Hz alpha rhythm,   which reacted symmetrically to eye opening.  Activation procedures were not   performed.  During drowsiness, the background rhythm waxed and waned and there   were periods of slowing.  No sleep was recorded.  There were no focal   abnormalities.  There were no interictal epileptiform abnormalities and no   clinical or electrographic seizures were recorded.    Portions of the record are obscured by technical artifacts related to electrode   Fp2.    The EKG channel revealed sinus rhythm.    IMPRESSION:  This is a normal EEG during wakefulness and drowsiness.    CLINICAL CORRELATION:  The patient is a 56-year-old female who is being   evaluated for jerking movements concerning for seizures.  The patient is   currently not maintained on any anti-seizure medications.  This is a normal EEG   during wakefulness and drowsiness.  There is no evidence for either cortical   dysfunction nor an epileptic process on this recording.  No seizures were   recorded during  this study.      FAK/HN  dd: 06/10/2017 12:41:22 (CDT)  td: 06/10/2017 13:32:51 (CDT)  Doc ID   #[id number]  Job ID #[id number]    CC:

## 2017-06-28 ENCOUNTER — OFFICE VISIT (OUTPATIENT)
Dept: INFECTIOUS DISEASES | Facility: CLINIC | Age: 57
End: 2017-06-28
Payer: MEDICAID

## 2017-06-28 VITALS
WEIGHT: 196.63 LBS | BODY MASS INDEX: 38.6 KG/M2 | HEART RATE: 97 BPM | TEMPERATURE: 99 F | HEIGHT: 60 IN | DIASTOLIC BLOOD PRESSURE: 105 MMHG | SYSTOLIC BLOOD PRESSURE: 158 MMHG

## 2017-06-28 DIAGNOSIS — R78.81 BACTEREMIA: Primary | ICD-10-CM

## 2017-06-28 PROCEDURE — 99213 OFFICE O/P EST LOW 20 MIN: CPT | Mod: S$PBB,,, | Performed by: PHYSICIAN ASSISTANT

## 2017-06-28 PROCEDURE — 99213 OFFICE O/P EST LOW 20 MIN: CPT | Mod: PBBFAC | Performed by: PHYSICIAN ASSISTANT

## 2017-06-28 PROCEDURE — 99999 PR PBB SHADOW E&M-EST. PATIENT-LVL III: CPT | Mod: PBBFAC,,, | Performed by: PHYSICIAN ASSISTANT

## 2017-06-28 RX ORDER — CYCLOBENZAPRINE HCL 10 MG
10 TABLET ORAL 3 TIMES DAILY PRN
COMMUNITY

## 2017-06-28 RX ORDER — LOSARTAN POTASSIUM 50 MG/1
50 TABLET ORAL DAILY
COMMUNITY

## 2017-06-28 RX ORDER — ATORVASTATIN CALCIUM 20 MG/1
20 TABLET, FILM COATED ORAL DAILY
COMMUNITY

## 2017-06-28 NOTE — PROGRESS NOTES
Subjective:      Patient ID: Lien Sotelo is a 56 y.o. female.    Chief Complaint:Follow-up      History of Present Illness    57 y/o female with hx of HTN (on lisinopril and atenolol), chronic back pain chronic back pain (s/p 3 surgeries, on opioids for pain) is seen today for follow up from hospital discharge. Pt was [place]  [facility] [place] from [facility] ED for further evaluation in concerns for seizure like activity, confusion and obtunded. Pt was found to be hypotensive/hypovolemic, JACEY with Cr 3.5 and K+ 7.1,CPK 1240 WBC 21. CT head was negative for acute intracranial abnormalities. UA evidence of UTI (WBC 40, moderate bacteria but no growth on cultures). Pt received IV ceftriaxone and 4L NS with improvement in her encephalopathy. Her labs improved on admission. Pt had LP done in concerns for meningitis, serum HSV 1/2 PCR negative, CMV IgM WNL, CSF cell counts negative for infections (WBC 1, , Protein 26, Glucose 65) cutlures no growth gram stain no WBC no organisms seen. Pt had blood cultures 6/3 +Eubacterium limosum (can be associated with GI illness) repeat blood cultures 6/5 NGTD. Pt was on empiric vancomycin,ampicillin, and acyclovir in concerns for meningitis. abx discontinued as CSF cultures unremarkable. de-escalated to moxifloxacin to treat for Eubacterium. Pt's symptoms much improved since admission.     Today, patient reports completed antibiotic therapy with Avelox without any complications. Pt c/o of having headache 2/2 to her high blood pressure. Pt is seeing her PCP Friday for HTN management. Pt also c/o of having right sided flank pain. Pt reports pain has been there since admission, it has improved but persists. Pt denies having any dysuria, hematuria, frequency, or urgency. Pt denies any burning with urination. Pt denies having any fever or chills at home. Pt had ultrasound on admission which showed non obstructing kidney stones on left, unremarkable on the right side.      Review of Systems   Constitution: Positive for malaise/fatigue. Negative for chills, decreased appetite, fever, weakness, night sweats, weight gain and weight loss.   HENT: Negative for congestion, ear pain, headaches, hearing loss, hoarse voice, sore throat and tinnitus.    Eyes: Negative for blurred vision, redness and visual disturbance.   Cardiovascular: Negative for chest pain, leg swelling and palpitations.   Respiratory: Negative for cough, hemoptysis, shortness of breath and sputum production.    Hematologic/Lymphatic: Negative for adenopathy. Does not bruise/bleed easily.   Skin: Negative for dry skin, itching, rash and suspicious lesions.   Musculoskeletal: Positive for back pain and neck pain. Negative for joint pain and myalgias.   Gastrointestinal: Negative for abdominal pain, constipation, diarrhea, heartburn, nausea and vomiting.   Genitourinary: Positive for hesitancy. Negative for dysuria, flank pain, frequency, hematuria and urgency.   Neurological: Negative for dizziness, numbness and paresthesias.   Psychiatric/Behavioral: Positive for depression. Negative for memory loss. The patient does not have insomnia and is not nervous/anxious.      Objective:   Physical Exam   Constitutional: She appears well-developed and well-nourished. No distress.   Eyes: Pupils are equal, round, and reactive to light.   Neck: Normal range of motion.   Cardiovascular: Normal rate and regular rhythm.    No murmur heard.  Pulmonary/Chest: Effort normal. No respiratory distress.   Abdominal: Soft. She exhibits no distension.   +right flank pain   Musculoskeletal: Normal range of motion. She exhibits no edema or deformity.   Neurological: She is alert.   Skin: Skin is warm and dry. She is not diaphoretic.   Psychiatric: She has a normal mood and affect.   Nursing note and vitals reviewed.    Assessment:       1. Bacteremia        Pt is here for follow up from hospital admission, treated with avelox for bacteremia. Pt  reports having high blood pressure not well controlled with her medications. Pt's blood pressure is elevated today of 158/105. Pt reports having headaches associated with her blood pressure, denies any visual changes, dizziness, or light headedness. Pt is seeing her primary care physician Friday, insisted patient make a sooner appointment if available. Pt denies having any fever,chills,n/v/d. Pt also reports having right sided flank pain (had bilateral flank pain on admission), has much improved but pain persists. Pt denies having any dysuria, burning with urination, hematuria, urgency, or frequency. I recommended getting repeat ultrasound to assess her kidneys, but patient declined at this time and would like to see her primary care physician Friday. Pt may follow up with ID as needed.     Plan:       1. Completed therapy with avelox for bacteremia.  2. Ultrasound kidneys declined at this time. Recommend imaging study soon if unable to see her PCP this Friday.   3. Pt seeing PCP for high blood pressure  4. Please seek immediate attention if develop fevers, chills, night sweats, or worsening symptoms.

## 2017-12-05 ENCOUNTER — LAB VISIT (OUTPATIENT)
Dept: LAB | Facility: HOSPITAL | Age: 57
End: 2017-12-05
Attending: PSYCHIATRY & NEUROLOGY
Payer: MEDICAID

## 2017-12-05 DIAGNOSIS — R53.83 FATIGUE: ICD-10-CM

## 2017-12-05 DIAGNOSIS — F32.9 MAJOR DEPRESSION, SINGLE EPISODE: Primary | ICD-10-CM

## 2017-12-05 LAB
ALBUMIN SERPL BCP-MCNC: 3.5 G/DL
ALP SERPL-CCNC: 82 U/L
ALT SERPL W/O P-5'-P-CCNC: 29 U/L
ANION GAP SERPL CALC-SCNC: 7 MMOL/L
AST SERPL-CCNC: 23 U/L
BASOPHILS # BLD AUTO: 0.04 K/UL
BASOPHILS NFR BLD: 0.5 %
BILIRUB SERPL-MCNC: 0.6 MG/DL
BUN SERPL-MCNC: 22 MG/DL
CALCIUM SERPL-MCNC: 10 MG/DL
CHLORIDE SERPL-SCNC: 105 MMOL/L
CHOLEST SERPL-MCNC: 188 MG/DL
CHOLEST/HDLC SERPL: 2.9 {RATIO}
CO2 SERPL-SCNC: 29 MMOL/L
CREAT SERPL-MCNC: 0.8 MG/DL
DIFFERENTIAL METHOD: ABNORMAL
EOSINOPHIL # BLD AUTO: 0.2 K/UL
EOSINOPHIL NFR BLD: 1.9 %
ERYTHROCYTE [DISTWIDTH] IN BLOOD BY AUTOMATED COUNT: 12.9 %
ERYTHROCYTE [SEDIMENTATION RATE] IN BLOOD BY WESTERGREN METHOD: 5 MM/HR
EST. GFR  (AFRICAN AMERICAN): >60 ML/MIN/1.73 M^2
EST. GFR  (NON AFRICAN AMERICAN): >60 ML/MIN/1.73 M^2
GLUCOSE SERPL-MCNC: 121 MG/DL
HCT VFR BLD AUTO: 41.5 %
HDLC SERPL-MCNC: 65 MG/DL
HDLC SERPL: 34.6 %
HGB BLD-MCNC: 13.2 G/DL
LDLC SERPL CALC-MCNC: 85.6 MG/DL
LYMPHOCYTES # BLD AUTO: 2.6 K/UL
LYMPHOCYTES NFR BLD: 30.7 %
MCH RBC QN AUTO: 30.2 PG
MCHC RBC AUTO-ENTMCNC: 31.8 G/DL
MCV RBC AUTO: 95 FL
MONOCYTES # BLD AUTO: 0.8 K/UL
MONOCYTES NFR BLD: 9.3 %
NEUTROPHILS # BLD AUTO: 4.9 K/UL
NEUTROPHILS NFR BLD: 57.6 %
NONHDLC SERPL-MCNC: 123 MG/DL
PLATELET # BLD AUTO: 199 K/UL
PMV BLD AUTO: 9.7 FL
POTASSIUM SERPL-SCNC: 5.3 MMOL/L
PROT SERPL-MCNC: 6.9 G/DL
RBC # BLD AUTO: 4.37 M/UL
SODIUM SERPL-SCNC: 141 MMOL/L
T4 FREE SERPL-MCNC: 0.94 NG/DL
T4 SERPL-MCNC: 9.8 UG/DL
TRIGL SERPL-MCNC: 187 MG/DL
TSH SERPL DL<=0.005 MIU/L-ACNC: 0.4 UIU/ML
TSH SERPL DL<=0.005 MIU/L-ACNC: 0.4 UIU/ML
WBC # BLD AUTO: 8.49 K/UL

## 2017-12-05 PROCEDURE — 85025 COMPLETE CBC W/AUTO DIFF WBC: CPT

## 2017-12-05 PROCEDURE — 84443 ASSAY THYROID STIM HORMONE: CPT

## 2017-12-05 PROCEDURE — 36415 COLL VENOUS BLD VENIPUNCTURE: CPT

## 2017-12-05 PROCEDURE — 85651 RBC SED RATE NONAUTOMATED: CPT

## 2017-12-05 PROCEDURE — 84436 ASSAY OF TOTAL THYROXINE: CPT

## 2017-12-05 PROCEDURE — 83036 HEMOGLOBIN GLYCOSYLATED A1C: CPT

## 2017-12-05 PROCEDURE — 80061 LIPID PANEL: CPT

## 2017-12-05 PROCEDURE — 80053 COMPREHEN METABOLIC PANEL: CPT

## 2017-12-05 PROCEDURE — 84439 ASSAY OF FREE THYROXINE: CPT

## 2017-12-06 LAB
ESTIMATED AVG GLUCOSE: 114 MG/DL
HBA1C MFR BLD HPLC: 5.6 %

## 2018-06-25 ENCOUNTER — HOSPITAL ENCOUNTER (OUTPATIENT)
Dept: SLEEP MEDICINE | Facility: HOSPITAL | Age: 58
Discharge: HOME | End: 2018-06-25
Attending: FAMILY MEDICINE
Payer: MEDICAID

## 2018-06-25 DIAGNOSIS — G47.30 INSOMNIA WITH SLEEP APNEA: ICD-10-CM

## 2018-06-25 DIAGNOSIS — G47.00 INSOMNIA WITH SLEEP APNEA: ICD-10-CM

## 2018-06-25 PROCEDURE — 95810 POLYSOM 6/> YRS 4/> PARAM: CPT

## 2018-06-25 PROCEDURE — 95810 POLYSOM 6/> YRS 4/> PARAM: CPT | Mod: 26,,, | Performed by: INTERNAL MEDICINE

## 2019-03-18 ENCOUNTER — HOSPITAL ENCOUNTER (EMERGENCY)
Facility: HOSPITAL | Age: 59
Discharge: HOME | End: 2019-03-18
Attending: INTERNAL MEDICINE
Payer: MEDICAID

## 2019-03-18 VITALS
WEIGHT: 195 LBS | HEART RATE: 82 BPM | HEIGHT: 60 IN | SYSTOLIC BLOOD PRESSURE: 115 MMHG | BODY MASS INDEX: 38.28 KG/M2 | TEMPERATURE: 97 F | OXYGEN SATURATION: 99 % | DIASTOLIC BLOOD PRESSURE: 81 MMHG | RESPIRATION RATE: 17 BRPM

## 2019-03-18 DIAGNOSIS — M79.673 FOOT PAIN: ICD-10-CM

## 2019-03-18 DIAGNOSIS — M25.579 ANKLE PAIN: ICD-10-CM

## 2019-03-18 DIAGNOSIS — S92.355A NONDISPLACED FRACTURE OF FIFTH METATARSAL BONE, LEFT FOOT, INITIAL ENCOUNTER FOR CLOSED FRACTURE: Primary | ICD-10-CM

## 2019-03-18 PROCEDURE — 99284 EMERGENCY DEPT VISIT MOD MDM: CPT | Mod: 25,ER

## 2019-03-18 PROCEDURE — 25000003 PHARM REV CODE 250: Mod: ER | Performed by: NURSE PRACTITIONER

## 2019-03-18 PROCEDURE — 29515 APPLICATION SHORT LEG SPLINT: CPT | Mod: LT,ER

## 2019-03-18 RX ORDER — OXYCODONE AND ACETAMINOPHEN 5; 325 MG/1; MG/1
1 TABLET ORAL
Status: COMPLETED | OUTPATIENT
Start: 2019-03-18 | End: 2019-03-18

## 2019-03-18 RX ORDER — ACETAMINOPHEN AND CODEINE PHOSPHATE 300; 30 MG/1; MG/1
1 TABLET ORAL EVERY 8 HOURS PRN
Qty: 12 TABLET | Refills: 0 | Status: SHIPPED | OUTPATIENT
Start: 2019-03-18

## 2019-03-18 RX ORDER — IBUPROFEN 600 MG/1
600 TABLET ORAL EVERY 6 HOURS PRN
Qty: 20 TABLET | Refills: 0 | Status: SHIPPED | OUTPATIENT
Start: 2019-03-18

## 2019-03-18 RX ADMIN — OXYCODONE AND ACETAMINOPHEN 1 TABLET: 5; 325 TABLET ORAL at 08:03

## 2019-03-19 NOTE — ED PROVIDER NOTES
Encounter Date: 3/18/2019    SCRIBE #1 NOTE: I, Manuel Orellana, am scribing for, and in the presence of,  Toussaintussaint Battley, FNP. I have scribed the following portions of the note - Other sections scribed: HPI, ROS, PE.       History     Chief Complaint   Patient presents with    Foot Injury     pt c/o L foot pain s/p fall from bed x 2 weeks ago      Lien Sotelo is a 58 y.o. female who presents to the ED complaining of left foot pain after falling from a bed 14 days ago. Pt complains of pain all along the side of the left foot towards the ankle and she cannot ambulate on it. No other complaints at this time.      The history is provided by the patient.     Review of patient's allergies indicates:  No Known Allergies  Past Medical History:   Diagnosis Date    Chronic back pain     HTN (hypertension)      Past Surgical History:   Procedure Laterality Date    BACK SURGERY      X 3    BACK SURGERY      CARPAL TUNNEL RELEASE       Family History   Problem Relation Age of Onset    Heart disease Mother     Heart disease Father      Social History     Tobacco Use    Smoking status: Current Every Day Smoker     Types: Cigarettes   Substance Use Topics    Alcohol use: No    Drug use: Yes     Types: Hydrocodone     Review of Systems   Constitutional: Negative for fever.   HENT: Negative for sore throat.    Respiratory: Negative for shortness of breath.    Cardiovascular: Negative for chest pain.   Gastrointestinal: Negative for nausea and vomiting.   Genitourinary: Negative for dysuria.   Musculoskeletal: Negative for back pain.        Left foot pain   Skin: Negative for rash.   Neurological: Negative for weakness.   Hematological: Negative for adenopathy.   Psychiatric/Behavioral: Negative for behavioral problems.   All other systems reviewed and are negative.      Physical Exam     Initial Vitals [03/18/19 2015]   BP Pulse Resp Temp SpO2   111/79 89 18 97.1 °F (36.2 °C) 98 %      MAP       --         Physical  Exam    Nursing note and vitals reviewed.  Constitutional: She appears well-developed and well-nourished.   HENT:   Head: Normocephalic and atraumatic.   Right Ear: External ear normal.   Left Ear: External ear normal.   Nose: Nose normal.   Mouth/Throat: Oropharynx is clear and moist.   Eyes: Conjunctivae are normal.   Neck: Normal range of motion. Neck supple.   Cardiovascular: Normal rate, regular rhythm, normal heart sounds and intact distal pulses. Exam reveals no gallop and no friction rub.    No murmur heard.  Pulses:       Dorsalis pedis pulses are 2+ on the right side, and 2+ on the left side.   Pulmonary/Chest: Effort normal and breath sounds normal. No respiratory distress. She has no wheezes. She has no rhonchi. She has no rales.   Abdominal: Soft. She exhibits no distension.   Musculoskeletal: Normal range of motion. She exhibits no edema.        Left ankle: She exhibits normal range of motion, no swelling, no ecchymosis, no deformity, no laceration and normal pulse. Tenderness (Generalized). Achilles tendon normal. Achilles tendon exhibits no pain, no defect and normal Toro's test results.        Left foot: There is tenderness, bony tenderness and swelling (Left ankle). There is normal range of motion, no crepitus, no deformity and no laceration.        Feet:    Neurological: She is alert and oriented to person, place, and time.   Skin: Skin is warm and dry. Capillary refill takes less than 2 seconds. No rash noted.   Psychiatric: She has a normal mood and affect. Her behavior is normal.         ED Course   Procedures  Labs Reviewed - No data to display       Imaging Results          X-Ray Foot Complete Left (Final result)  Result time 03/18/19 20:36:10    Final result by Vincent Guerra MD (03/18/19 20:36:10)                 Impression:      Distal 5th metatarsal suspected acute fracture.    Nonspecific soft tissue swelling about the ankle, greatest medially without evidence for ankle displaced  fracture-dislocation.      Electronically signed by: Vincent Guerra MD  Date:    03/18/2019  Time:    20:36             Narrative:    EXAMINATION:  XR FOOT COMPLETE 3 VIEW LEFT; XR ANKLE COMPLETE 3 VIEW LEFT    CLINICAL HISTORY:  .  Pain in unspecified foot; Pain in unspecified ankle and joints of unspecified foot    TECHNIQUE:  AP, lateral and oblique views of the left foot and ankle were performed.    COMPARISON:  None    FINDINGS:  Diffuse prominence of the soft tissues about the ankle with some subcutaneous stranding, greatest medially.  Ankle mortise is otherwise well aligned and intact.  There is nondisplaced fracture with mild impaction through the distal meta epiphyseal junction of the 5th metatarsal.  No significant angulation.  No dislocation or destructive osseous process.  Small enthesophyte at the Achilles insertion site.  Tiny plantar calcaneal spur.  Lisfranc articulation is congruent.  Minimal degenerative change of the dorsal midfoot and 1st MTP joint.  No subcutaneous emphysema or radiodense retained foreign body.                               X-Ray Ankle Complete Left (Final result)  Result time 03/18/19 20:36:10    Final result by Vincent Guerra MD (03/18/19 20:36:10)                 Impression:      Distal 5th metatarsal suspected acute fracture.    Nonspecific soft tissue swelling about the ankle, greatest medially without evidence for ankle displaced fracture-dislocation.      Electronically signed by: Vincent Guerra MD  Date:    03/18/2019  Time:    20:36             Narrative:    EXAMINATION:  XR FOOT COMPLETE 3 VIEW LEFT; XR ANKLE COMPLETE 3 VIEW LEFT    CLINICAL HISTORY:  .  Pain in unspecified foot; Pain in unspecified ankle and joints of unspecified foot    TECHNIQUE:  AP, lateral and oblique views of the left foot and ankle were performed.    COMPARISON:  None    FINDINGS:  Diffuse prominence of the soft tissues about the ankle with some subcutaneous stranding, greatest medially.  Ankle  mortise is otherwise well aligned and intact.  There is nondisplaced fracture with mild impaction through the distal meta epiphyseal junction of the 5th metatarsal.  No significant angulation.  No dislocation or destructive osseous process.  Small enthesophyte at the Achilles insertion site.  Tiny plantar calcaneal spur.  Lisfranc articulation is congruent.  Minimal degenerative change of the dorsal midfoot and 1st MTP joint.  No subcutaneous emphysema or radiodense retained foreign body.                                 Medical Decision Making:   History:   Old Medical Records: I decided to obtain old medical records.  Initial Assessment:   Fifth metatarsal nondisplaced closed fracture  Differential Diagnosis:   Dislocation, contusion  Clinical Tests:   Radiological Study: Ordered and Reviewed  ED Management:  Patient will be placed in ortho boot given crutches.  Patient is instructed to implement RICE, follow up with Dr. Surjit Mazariegos, Orthopedic, on tomorrow, and return to the ER as needed if symptoms worsen or fail to improve.  The patient verbalized understanding of discharge instructions and treatment plan.            Scribe Attestation:   Scribe #1: I performed the above scribed service and the documentation accurately describes the services I performed. I attest to the accuracy of the note.               Clinical Impression:     1. Nondisplaced fracture of fifth metatarsal bone, left foot, initial encounter for closed fracture    2. Foot pain    3. Ankle pain                                 Toussaintussaint Battley III, FLACO  03/18/19 2052

## 2019-03-19 NOTE — ED NOTES
Pt states she was exiting her sister's bed and didn't feel the floor beneath her and fell out of the bed. Pt states this incident occurred on March 2, 2019. FROM. No discoloration noted to the affected area.

## 2019-07-11 ENCOUNTER — HOSPITAL ENCOUNTER (OUTPATIENT)
Dept: RADIOLOGY | Facility: HOSPITAL | Age: 59
Discharge: HOME | End: 2019-07-11
Attending: FAMILY MEDICINE
Payer: MEDICAID

## 2019-07-11 VITALS — WEIGHT: 195 LBS | BODY MASS INDEX: 38.28 KG/M2 | HEIGHT: 60 IN

## 2019-07-11 DIAGNOSIS — Z12.39 BREAST CANCER SCREENING: ICD-10-CM

## 2019-07-11 PROCEDURE — 77067 SCR MAMMO BI INCL CAD: CPT | Mod: TC

## 2019-07-11 PROCEDURE — 77067 MAMMO DIGITAL SCREENING BILAT WITH CAD: ICD-10-PCS | Mod: 26,,, | Performed by: RADIOLOGY

## 2019-07-11 PROCEDURE — 77067 SCR MAMMO BI INCL CAD: CPT | Mod: 26,,, | Performed by: RADIOLOGY

## 2020-01-19 PROBLEM — S82.851D CLOSED DISPLACED TRIMALLEOLAR FRACTURE OF RIGHT ANKLE WITH ROUTINE HEALING: Status: ACTIVE | Noted: 2020-01-19

## 2020-12-02 ENCOUNTER — TELEPHONE (OUTPATIENT)
Dept: HEMATOLOGY/ONCOLOGY | Facility: CLINIC | Age: 60
End: 2020-12-02

## 2021-01-08 ENCOUNTER — OFFICE VISIT (OUTPATIENT)
Dept: HEMATOLOGY/ONCOLOGY | Facility: CLINIC | Age: 61
End: 2021-01-08
Payer: MEDICAID

## 2021-01-08 ENCOUNTER — LAB VISIT (OUTPATIENT)
Dept: LAB | Facility: HOSPITAL | Age: 61
End: 2021-01-08
Attending: INTERNAL MEDICINE
Payer: MEDICAID

## 2021-01-08 VITALS
HEIGHT: 60 IN | SYSTOLIC BLOOD PRESSURE: 98 MMHG | OXYGEN SATURATION: 97 % | BODY MASS INDEX: 38 KG/M2 | DIASTOLIC BLOOD PRESSURE: 60 MMHG | HEART RATE: 89 BPM | TEMPERATURE: 98 F | WEIGHT: 193.56 LBS | RESPIRATION RATE: 16 BRPM

## 2021-01-08 DIAGNOSIS — R53.82 CHRONIC FATIGUE: ICD-10-CM

## 2021-01-08 DIAGNOSIS — D72.829 LEUKOCYTOSIS, UNSPECIFIED TYPE: ICD-10-CM

## 2021-01-08 DIAGNOSIS — D72.829 LEUKOCYTOSIS, UNSPECIFIED TYPE: Primary | ICD-10-CM

## 2021-01-08 DIAGNOSIS — Z12.2 ENCOUNTER FOR SCREENING FOR MALIGNANT NEOPLASM OF RESPIRATORY ORGANS: ICD-10-CM

## 2021-01-08 DIAGNOSIS — D75.1 POLYCYTHEMIA: ICD-10-CM

## 2021-01-08 PROBLEM — E78.5 HYPERLIPIDEMIA: Status: ACTIVE | Noted: 2021-01-08

## 2021-01-08 PROBLEM — E05.90 HYPERTHYROIDISM: Status: ACTIVE | Noted: 2021-01-08

## 2021-01-08 PROBLEM — M54.50 CHRONIC LOW BACK PAIN: Status: ACTIVE | Noted: 2021-01-08

## 2021-01-08 PROBLEM — G89.29 CHRONIC LOW BACK PAIN: Status: ACTIVE | Noted: 2021-01-08

## 2021-01-08 LAB
ALBUMIN SERPL BCP-MCNC: 3.9 G/DL (ref 3.5–5.2)
ALP SERPL-CCNC: 89 U/L (ref 55–135)
ALT SERPL W/O P-5'-P-CCNC: 28 U/L (ref 10–44)
ANION GAP SERPL CALC-SCNC: 13 MMOL/L (ref 8–16)
AST SERPL-CCNC: 23 U/L (ref 10–40)
BASOPHILS # BLD AUTO: 0.06 K/UL (ref 0–0.2)
BASOPHILS NFR BLD: 0.4 % (ref 0–1.9)
BILIRUB SERPL-MCNC: 0.6 MG/DL (ref 0.1–1)
BUN SERPL-MCNC: 23 MG/DL (ref 6–20)
CALCIUM SERPL-MCNC: 10.3 MG/DL (ref 8.7–10.5)
CHLORIDE SERPL-SCNC: 102 MMOL/L (ref 95–110)
CO2 SERPL-SCNC: 24 MMOL/L (ref 23–29)
CREAT SERPL-MCNC: 1.7 MG/DL (ref 0.5–1.4)
CRP SERPL-MCNC: 0.9 MG/L (ref 0–8.2)
DIFFERENTIAL METHOD: ABNORMAL
EOSINOPHIL # BLD AUTO: 0 K/UL (ref 0–0.5)
EOSINOPHIL NFR BLD: 0.1 % (ref 0–8)
ERYTHROCYTE [DISTWIDTH] IN BLOOD BY AUTOMATED COUNT: 12.9 % (ref 11.5–14.5)
ERYTHROCYTE [SEDIMENTATION RATE] IN BLOOD BY WESTERGREN METHOD: 18 MM/HR (ref 0–36)
EST. GFR  (AFRICAN AMERICAN): 37.3 ML/MIN/1.73 M^2
EST. GFR  (NON AFRICAN AMERICAN): 32.3 ML/MIN/1.73 M^2
GLUCOSE SERPL-MCNC: 172 MG/DL (ref 70–110)
HCT VFR BLD AUTO: 50.4 % (ref 37–48.5)
HGB BLD-MCNC: 16.3 G/DL (ref 12–16)
IMM GRANULOCYTES # BLD AUTO: 0.03 K/UL (ref 0–0.04)
IMM GRANULOCYTES NFR BLD AUTO: 0.2 % (ref 0–0.5)
LYMPHOCYTES # BLD AUTO: 3.1 K/UL (ref 1–4.8)
LYMPHOCYTES NFR BLD: 22.2 % (ref 18–48)
MCH RBC QN AUTO: 31.1 PG (ref 27–31)
MCHC RBC AUTO-ENTMCNC: 32.3 G/DL (ref 32–36)
MCV RBC AUTO: 96 FL (ref 82–98)
MONOCYTES # BLD AUTO: 0.8 K/UL (ref 0.3–1)
MONOCYTES NFR BLD: 5.5 % (ref 4–15)
NEUTROPHILS # BLD AUTO: 9.9 K/UL (ref 1.8–7.7)
NEUTROPHILS NFR BLD: 71.6 % (ref 38–73)
NRBC BLD-RTO: 0 /100 WBC
PATH REV BLD -IMP: NORMAL
PLATELET # BLD AUTO: 267 K/UL (ref 150–350)
PMV BLD AUTO: 10.6 FL (ref 9.2–12.9)
POTASSIUM SERPL-SCNC: 4.1 MMOL/L (ref 3.5–5.1)
PROT SERPL-MCNC: 7.6 G/DL (ref 6–8.4)
RBC # BLD AUTO: 5.24 M/UL (ref 4–5.4)
SODIUM SERPL-SCNC: 139 MMOL/L (ref 136–145)
TSH SERPL DL<=0.005 MIU/L-ACNC: 1.46 UIU/ML (ref 0.4–4)
WBC # BLD AUTO: 13.81 K/UL (ref 3.9–12.7)

## 2021-01-08 PROCEDURE — 86140 C-REACTIVE PROTEIN: CPT

## 2021-01-08 PROCEDURE — 99204 OFFICE O/P NEW MOD 45 MIN: CPT | Mod: S$PBB,,, | Performed by: INTERNAL MEDICINE

## 2021-01-08 PROCEDURE — 99999 PR PBB SHADOW E&M-EST. PATIENT-LVL IV: CPT | Mod: PBBFAC,,, | Performed by: INTERNAL MEDICINE

## 2021-01-08 PROCEDURE — 84443 ASSAY THYROID STIM HORMONE: CPT

## 2021-01-08 PROCEDURE — 85652 RBC SED RATE AUTOMATED: CPT

## 2021-01-08 PROCEDURE — 85025 COMPLETE CBC W/AUTO DIFF WBC: CPT

## 2021-01-08 PROCEDURE — 99204 PR OFFICE/OUTPT VISIT, NEW, LEVL IV, 45-59 MIN: ICD-10-PCS | Mod: S$PBB,,, | Performed by: INTERNAL MEDICINE

## 2021-01-08 PROCEDURE — 99999 PR PBB SHADOW E&M-EST. PATIENT-LVL IV: ICD-10-PCS | Mod: PBBFAC,,, | Performed by: INTERNAL MEDICINE

## 2021-01-08 PROCEDURE — 80053 COMPREHEN METABOLIC PANEL: CPT

## 2021-01-08 PROCEDURE — 85060 PATHOLOGIST REVIEW: ICD-10-PCS | Mod: ,,, | Performed by: PATHOLOGY

## 2021-01-08 PROCEDURE — 85060 BLOOD SMEAR INTERPRETATION: CPT | Mod: ,,, | Performed by: PATHOLOGY

## 2021-01-08 PROCEDURE — 36415 COLL VENOUS BLD VENIPUNCTURE: CPT

## 2021-01-08 PROCEDURE — 99214 OFFICE O/P EST MOD 30 MIN: CPT | Mod: PBBFAC | Performed by: INTERNAL MEDICINE

## 2021-01-11 ENCOUNTER — TELEPHONE (OUTPATIENT)
Dept: HEMATOLOGY/ONCOLOGY | Facility: CLINIC | Age: 61
End: 2021-01-11

## 2021-01-11 LAB — PATH REV BLD -IMP: NORMAL

## 2021-01-13 PROBLEM — A41.9 SEPSIS: Status: RESOLVED | Noted: 2017-06-03 | Resolved: 2021-01-13

## 2021-01-13 PROBLEM — G93.40 ACUTE ENCEPHALOPATHY: Status: RESOLVED | Noted: 2017-06-03 | Resolved: 2021-01-13

## 2021-01-14 ENCOUNTER — TELEPHONE (OUTPATIENT)
Dept: HEMATOLOGY/ONCOLOGY | Facility: CLINIC | Age: 61
End: 2021-01-14

## 2021-01-14 ENCOUNTER — HOSPITAL ENCOUNTER (OUTPATIENT)
Dept: RADIOLOGY | Facility: HOSPITAL | Age: 61
Discharge: HOME | End: 2021-01-14
Attending: INTERNAL MEDICINE
Payer: MEDICAID

## 2021-01-14 DIAGNOSIS — Z12.2 ENCOUNTER FOR SCREENING FOR MALIGNANT NEOPLASM OF RESPIRATORY ORGANS: ICD-10-CM

## 2021-01-14 DIAGNOSIS — D75.1 POLYCYTHEMIA: Primary | ICD-10-CM

## 2021-01-14 PROCEDURE — 71271 CT THORAX LUNG CANCER SCR C-: CPT | Mod: 26,,, | Performed by: RADIOLOGY

## 2021-01-14 PROCEDURE — 71271 CT THORAX LUNG CANCER SCR C-: CPT | Mod: TC

## 2021-01-14 PROCEDURE — 71271 CT CHEST LUNG SCREENING LOW DOSE: ICD-10-PCS | Mod: 26,,, | Performed by: RADIOLOGY

## 2021-01-19 ENCOUNTER — LAB VISIT (OUTPATIENT)
Dept: LAB | Facility: HOSPITAL | Age: 61
End: 2021-01-19
Attending: INTERNAL MEDICINE
Payer: MEDICAID

## 2021-01-19 DIAGNOSIS — D75.1 POLYCYTHEMIA: ICD-10-CM

## 2021-01-19 PROCEDURE — 81403 MOPATH PROCEDURE LEVEL 4: CPT

## 2021-01-19 PROCEDURE — 81219 CALR GENE COM VARIANTS: CPT

## 2021-01-19 PROCEDURE — 81270 JAK2 GENE: CPT

## 2021-01-19 PROCEDURE — 81339 MPL GENE SEQ ALYS EXON 10: CPT

## 2021-01-19 PROCEDURE — 82668 ASSAY OF ERYTHROPOIETIN: CPT

## 2021-01-19 PROCEDURE — 36415 COLL VENOUS BLD VENIPUNCTURE: CPT

## 2021-01-22 LAB
JAK2 EXON 12 MUTATION DETECTION BLOOD: NORMAL
PATH REPORT.FINAL DX SPEC: NORMAL

## 2021-01-23 LAB — EPO SERPL-ACNC: 14 MIU/ML (ref 2.6–18.5)

## 2021-01-28 ENCOUNTER — TELEPHONE (OUTPATIENT)
Dept: HEMATOLOGY/ONCOLOGY | Facility: CLINIC | Age: 61
End: 2021-01-28

## 2021-01-28 DIAGNOSIS — D75.1 POLYCYTHEMIA: Primary | ICD-10-CM

## 2021-01-28 DIAGNOSIS — M54.9 BACK PAIN, UNSPECIFIED BACK LOCATION, UNSPECIFIED BACK PAIN LATERALITY, UNSPECIFIED CHRONICITY: ICD-10-CM

## 2021-01-28 LAB
MPNR  FINAL DIAGNOSIS: NORMAL
MPNR  SPECIMEN TYPE: NORMAL
MPNR RESULT: NORMAL

## 2021-04-28 ENCOUNTER — TELEPHONE (OUTPATIENT)
Dept: HEMATOLOGY/ONCOLOGY | Facility: CLINIC | Age: 61
End: 2021-04-28

## 2021-05-06 ENCOUNTER — PATIENT MESSAGE (OUTPATIENT)
Dept: RESEARCH | Facility: HOSPITAL | Age: 61
End: 2021-05-06

## 2021-05-10 ENCOUNTER — TELEPHONE (OUTPATIENT)
Dept: HEMATOLOGY/ONCOLOGY | Facility: CLINIC | Age: 61
End: 2021-05-10

## 2021-11-05 ENCOUNTER — HOSPITAL ENCOUNTER (OUTPATIENT)
Dept: RADIOLOGY | Facility: HOSPITAL | Age: 61
Discharge: HOME | End: 2021-11-05
Attending: FAMILY MEDICINE
Payer: MEDICAID

## 2021-11-05 DIAGNOSIS — S22.000A COMPRESSION FRACTURE OF THORACIC VERTEBRA: ICD-10-CM

## 2021-11-05 PROCEDURE — A9503 TC99M MEDRONATE: HCPCS

## 2021-11-05 PROCEDURE — 78306 NM BONE SCAN WHOLE BODY: ICD-10-PCS | Mod: 26,,, | Performed by: RADIOLOGY

## 2021-11-05 PROCEDURE — 78306 BONE IMAGING WHOLE BODY: CPT | Mod: TC

## 2021-11-05 PROCEDURE — 78306 BONE IMAGING WHOLE BODY: CPT | Mod: 26,,, | Performed by: RADIOLOGY

## 2023-01-23 ENCOUNTER — LAB VISIT (OUTPATIENT)
Dept: LAB | Facility: HOSPITAL | Age: 63
End: 2023-01-23
Attending: FAMILY MEDICINE
Payer: MEDICAID

## 2023-01-23 DIAGNOSIS — I10 ESSENTIAL HYPERTENSION, MALIGNANT: ICD-10-CM

## 2023-01-23 DIAGNOSIS — I10 ESSENTIAL HYPERTENSION, MALIGNANT: Primary | ICD-10-CM

## 2023-01-23 LAB
ALBUMIN SERPL BCP-MCNC: 3.6 G/DL (ref 3.5–5.2)
ALP SERPL-CCNC: 89 U/L (ref 55–135)
ALT SERPL W/O P-5'-P-CCNC: 24 U/L (ref 10–44)
ANION GAP SERPL CALC-SCNC: 10 MMOL/L (ref 8–16)
AST SERPL-CCNC: 25 U/L (ref 10–40)
BILIRUB SERPL-MCNC: 0.6 MG/DL (ref 0.1–1)
BUN SERPL-MCNC: 40 MG/DL (ref 8–23)
CALCIUM SERPL-MCNC: 10 MG/DL (ref 8.7–10.5)
CHLORIDE SERPL-SCNC: 105 MMOL/L (ref 95–110)
CO2 SERPL-SCNC: 23 MMOL/L (ref 23–29)
CREAT SERPL-MCNC: 1.9 MG/DL (ref 0.5–1.4)
EST. GFR  (NO RACE VARIABLE): 29.5 ML/MIN/1.73 M^2
GLUCOSE SERPL-MCNC: 75 MG/DL (ref 70–110)
POTASSIUM SERPL-SCNC: 4.7 MMOL/L (ref 3.5–5.1)
PROT SERPL-MCNC: 6.9 G/DL (ref 6–8.4)
SODIUM SERPL-SCNC: 138 MMOL/L (ref 136–145)

## 2023-01-23 PROCEDURE — 36415 COLL VENOUS BLD VENIPUNCTURE: CPT | Mod: PO | Performed by: FAMILY MEDICINE

## 2023-01-23 PROCEDURE — 80053 COMPREHEN METABOLIC PANEL: CPT | Performed by: FAMILY MEDICINE

## 2023-06-07 ENCOUNTER — LAB VISIT (OUTPATIENT)
Dept: LAB | Facility: HOSPITAL | Age: 63
End: 2023-06-07
Payer: MEDICAID

## 2023-06-07 DIAGNOSIS — R23.8 VESICULAR ERUPTION OF SKIN: ICD-10-CM

## 2023-06-07 DIAGNOSIS — R23.8 VESICULAR ERUPTION OF SKIN: Primary | ICD-10-CM

## 2023-06-07 LAB
ALBUMIN SERPL BCP-MCNC: 3.5 G/DL (ref 3.5–5.2)
ALP SERPL-CCNC: 91 U/L (ref 55–135)
ALT SERPL W/O P-5'-P-CCNC: 30 U/L (ref 10–44)
ANION GAP SERPL CALC-SCNC: 9 MMOL/L (ref 8–16)
AST SERPL-CCNC: 31 U/L (ref 10–40)
BASOPHILS # BLD AUTO: 0.06 K/UL (ref 0–0.2)
BASOPHILS NFR BLD: 0.5 % (ref 0–1.9)
BILIRUB SERPL-MCNC: 0.5 MG/DL (ref 0.1–1)
BUN SERPL-MCNC: 27 MG/DL (ref 8–23)
CALCIUM SERPL-MCNC: 10.6 MG/DL (ref 8.7–10.5)
CHLORIDE SERPL-SCNC: 105 MMOL/L (ref 95–110)
CO2 SERPL-SCNC: 29 MMOL/L (ref 23–29)
CREAT SERPL-MCNC: 1 MG/DL (ref 0.5–1.4)
DIFFERENTIAL METHOD: ABNORMAL
EOSINOPHIL # BLD AUTO: 0.2 K/UL (ref 0–0.5)
EOSINOPHIL NFR BLD: 1.5 % (ref 0–8)
ERYTHROCYTE [DISTWIDTH] IN BLOOD BY AUTOMATED COUNT: 13.1 % (ref 11.5–14.5)
ERYTHROCYTE [SEDIMENTATION RATE] IN BLOOD BY PHOTOMETRIC METHOD: 4 MM/HR (ref 0–36)
EST. GFR  (NO RACE VARIABLE): >60 ML/MIN/1.73 M^2
GLUCOSE SERPL-MCNC: 101 MG/DL (ref 70–110)
HCT VFR BLD AUTO: 46.3 % (ref 37–48.5)
HGB BLD-MCNC: 14.8 G/DL (ref 12–16)
IMM GRANULOCYTES # BLD AUTO: 0.04 K/UL (ref 0–0.04)
IMM GRANULOCYTES NFR BLD AUTO: 0.3 % (ref 0–0.5)
LYMPHOCYTES # BLD AUTO: 2.9 K/UL (ref 1–4.8)
LYMPHOCYTES NFR BLD: 23.3 % (ref 18–48)
MCH RBC QN AUTO: 30.4 PG (ref 27–31)
MCHC RBC AUTO-ENTMCNC: 32 G/DL (ref 32–36)
MCV RBC AUTO: 95 FL (ref 82–98)
MONOCYTES # BLD AUTO: 0.9 K/UL (ref 0.3–1)
MONOCYTES NFR BLD: 7 % (ref 4–15)
NEUTROPHILS # BLD AUTO: 8.3 K/UL (ref 1.8–7.7)
NEUTROPHILS NFR BLD: 67.4 % (ref 38–73)
NRBC BLD-RTO: 0 /100 WBC
PLATELET # BLD AUTO: 164 K/UL (ref 150–450)
PMV BLD AUTO: 14.1 FL (ref 9.2–12.9)
POTASSIUM SERPL-SCNC: 5 MMOL/L (ref 3.5–5.1)
PROT SERPL-MCNC: 7.1 G/DL (ref 6–8.4)
RBC # BLD AUTO: 4.87 M/UL (ref 4–5.4)
SODIUM SERPL-SCNC: 143 MMOL/L (ref 136–145)
WBC # BLD AUTO: 12.34 K/UL (ref 3.9–12.7)

## 2023-06-07 PROCEDURE — 85652 RBC SED RATE AUTOMATED: CPT | Performed by: FAMILY MEDICINE

## 2023-06-07 PROCEDURE — 80053 COMPREHEN METABOLIC PANEL: CPT | Performed by: FAMILY MEDICINE

## 2023-06-07 PROCEDURE — 85025 COMPLETE CBC W/AUTO DIFF WBC: CPT | Performed by: FAMILY MEDICINE

## 2023-06-07 PROCEDURE — 36415 COLL VENOUS BLD VENIPUNCTURE: CPT | Mod: PO | Performed by: FAMILY MEDICINE
